# Patient Record
Sex: FEMALE | Race: BLACK OR AFRICAN AMERICAN | NOT HISPANIC OR LATINO | Employment: OTHER | URBAN - METROPOLITAN AREA
[De-identification: names, ages, dates, MRNs, and addresses within clinical notes are randomized per-mention and may not be internally consistent; named-entity substitution may affect disease eponyms.]

---

## 2023-08-30 LAB
ALBUMIN (G/DL) IN SER/PLAS: 4.3 G/DL (ref 3.4–5)
ANION GAP IN SER/PLAS: 13 MMOL/L (ref 10–20)
CALCIUM (MG/DL) IN SER/PLAS: 9.4 MG/DL (ref 8.6–10.3)
CARBAMAZEPINE (UG/ML) IN SER/PLAS: 9 UG/ML (ref 4–12)
CARBON DIOXIDE, TOTAL (MMOL/L) IN SER/PLAS: 27 MMOL/L (ref 21–32)
CHLORIDE (MMOL/L) IN SER/PLAS: 101 MMOL/L (ref 98–107)
CREATININE (MG/DL) IN SER/PLAS: 0.75 MG/DL (ref 0.5–1.05)
ERYTHROCYTE DISTRIBUTION WIDTH (RATIO) BY AUTOMATED COUNT: 12.2 % (ref 11.5–14.5)
ERYTHROCYTE MEAN CORPUSCULAR HEMOGLOBIN CONCENTRATION (G/DL) BY AUTOMATED: 31.2 G/DL (ref 32–36)
ERYTHROCYTE MEAN CORPUSCULAR VOLUME (FL) BY AUTOMATED COUNT: 91 FL (ref 80–100)
ERYTHROCYTES (10*6/UL) IN BLOOD BY AUTOMATED COUNT: 4.72 X10E12/L (ref 4–5.2)
GFR FEMALE: >90 ML/MIN/1.73M2
GLUCOSE (MG/DL) IN SER/PLAS: 88 MG/DL (ref 74–99)
HEMATOCRIT (%) IN BLOOD BY AUTOMATED COUNT: 42.9 % (ref 36–46)
HEMOGLOBIN (G/DL) IN BLOOD: 13.4 G/DL (ref 12–16)
KEPPRA: 29 UG/ML (ref 10–40)
LEUKOCYTES (10*3/UL) IN BLOOD BY AUTOMATED COUNT: 7.2 X10E9/L (ref 4.4–11.3)
NRBC (PER 100 WBCS) BY AUTOMATED COUNT: 0 /100 WBC (ref 0–0)
PHOSPHATE (MG/DL) IN SER/PLAS: 3.2 MG/DL (ref 2.5–4.9)
PLATELETS (10*3/UL) IN BLOOD AUTOMATED COUNT: 403 X10E9/L (ref 150–450)
POTASSIUM (MMOL/L) IN SER/PLAS: 4 MMOL/L (ref 3.5–5.3)
SODIUM (MMOL/L) IN SER/PLAS: 137 MMOL/L (ref 136–145)
UREA NITROGEN (MG/DL) IN SER/PLAS: 15 MG/DL (ref 6–23)

## 2023-09-01 LAB — CARBAMAZEPINE FREE (CC): 1.9 UG/ML (ref 0.8–2.4)

## 2023-10-04 DIAGNOSIS — F42.8 OBSESSIVE COMPULSIVE NEUROSIS: Primary | ICD-10-CM

## 2023-10-04 PROBLEM — F84.0 AUTISM SPECTRUM DISORDER (HHS-HCC): Status: ACTIVE | Noted: 2023-10-04

## 2023-10-04 PROBLEM — G40.909 EPILEPSY, UNSPECIFIED, NOT INTRACTABLE, WITHOUT STATUS EPILEPTICUS (MULTI): Status: ACTIVE | Noted: 2023-10-04

## 2023-10-04 PROBLEM — F79 INTELLECTUAL DISABILITY: Status: ACTIVE | Noted: 2023-10-04

## 2023-10-04 RX ORDER — RISPERIDONE 0.5 MG/1
TABLET ORAL
Qty: 60 TABLET | Refills: 3 | Status: SHIPPED | OUTPATIENT
Start: 2023-10-04 | End: 2024-01-03

## 2023-10-04 RX ORDER — RISPERIDONE 0.5 MG/1
TABLET ORAL
COMMUNITY
Start: 2020-10-12 | End: 2023-10-04 | Stop reason: SDUPTHER

## 2023-10-04 RX ORDER — SERTRALINE HYDROCHLORIDE 100 MG/1
1 TABLET, FILM COATED ORAL
COMMUNITY
Start: 2020-12-17 | End: 2023-10-04 | Stop reason: SDUPTHER

## 2023-10-04 RX ORDER — TRAZODONE HYDROCHLORIDE 100 MG/1
1 TABLET ORAL NIGHTLY
COMMUNITY
Start: 2022-08-18 | End: 2023-10-04 | Stop reason: SDUPTHER

## 2023-10-04 RX ORDER — TRAZODONE HYDROCHLORIDE 100 MG/1
100 TABLET ORAL NIGHTLY
Qty: 30 TABLET | Refills: 3 | Status: SHIPPED | OUTPATIENT
Start: 2023-10-04 | End: 2024-01-03

## 2023-10-04 RX ORDER — RISPERIDONE 0.25 MG/1
TABLET ORAL
Qty: 60 TABLET | Refills: 3 | Status: SHIPPED | OUTPATIENT
Start: 2023-10-04 | End: 2024-01-03

## 2023-10-04 RX ORDER — SERTRALINE HYDROCHLORIDE 50 MG/1
50 TABLET, FILM COATED ORAL DAILY
Qty: 30 TABLET | Refills: 3 | Status: SHIPPED | OUTPATIENT
Start: 2023-10-04 | End: 2024-01-03

## 2023-10-04 RX ORDER — CARBAMAZEPINE 100 MG/1
TABLET, CHEWABLE ORAL
COMMUNITY
Start: 2019-05-13 | End: 2023-10-11 | Stop reason: SDUPTHER

## 2023-10-04 RX ORDER — RISPERIDONE 0.25 MG/1
TABLET ORAL
COMMUNITY
Start: 2020-11-30 | End: 2023-10-04 | Stop reason: SDUPTHER

## 2023-10-04 RX ORDER — SERTRALINE HYDROCHLORIDE 50 MG/1
1 TABLET, FILM COATED ORAL
COMMUNITY
Start: 2021-02-11 | End: 2023-10-04 | Stop reason: SDUPTHER

## 2023-10-04 RX ORDER — POLYETHYLENE GLYCOL 3350 17 G/17G
1 POWDER, FOR SOLUTION ORAL DAILY
COMMUNITY
Start: 2023-04-29

## 2023-10-04 RX ORDER — LACTULOSE 10 G/15ML
SOLUTION ORAL; RECTAL 2 TIMES DAILY
COMMUNITY
Start: 2019-07-30 | End: 2024-04-22 | Stop reason: SDUPTHER

## 2023-10-04 RX ORDER — LEVETIRACETAM 100 MG/ML
SOLUTION ORAL 2 TIMES DAILY
COMMUNITY
Start: 2019-05-13

## 2023-10-04 RX ORDER — SERTRALINE HYDROCHLORIDE 100 MG/1
100 TABLET, FILM COATED ORAL DAILY
Qty: 30 TABLET | Refills: 3 | Status: SHIPPED | OUTPATIENT
Start: 2023-10-04 | End: 2024-01-03

## 2023-10-11 DIAGNOSIS — G40.409 OTHER GENERALIZED EPILEPSY, NOT INTRACTABLE, WITHOUT STATUS EPILEPTICUS (MULTI): ICD-10-CM

## 2023-10-11 RX ORDER — CARBAMAZEPINE 100 MG/1
500 TABLET, CHEWABLE ORAL 2 TIMES DAILY
Qty: 900 TABLET | Refills: 3 | Status: SHIPPED | OUTPATIENT
Start: 2023-10-11 | End: 2024-10-10

## 2023-11-22 ENCOUNTER — TELEMEDICINE (OUTPATIENT)
Dept: BEHAVIORAL HEALTH | Facility: CLINIC | Age: 38
End: 2023-11-22
Payer: MEDICAID

## 2023-11-22 DIAGNOSIS — F42.8 OBSESSIVE COMPULSIVE NEUROSIS: Primary | ICD-10-CM

## 2023-11-22 DIAGNOSIS — F84.0 AUTISM SPECTRUM DISORDER (HHS-HCC): ICD-10-CM

## 2023-11-22 DIAGNOSIS — F79 INTELLECTUAL DISABILITY: ICD-10-CM

## 2023-11-22 DIAGNOSIS — G40.409 OTHER GENERALIZED EPILEPSY, NOT INTRACTABLE, WITHOUT STATUS EPILEPTICUS (MULTI): ICD-10-CM

## 2023-11-22 DIAGNOSIS — Z79.899 HIGH RISK MEDICATION USE: ICD-10-CM

## 2023-11-22 PROBLEM — E66.3 OVERWEIGHT: Status: ACTIVE | Noted: 2023-11-22

## 2023-11-22 PROBLEM — R73.03 PREDIABETES: Status: ACTIVE | Noted: 2023-11-22

## 2023-11-22 PROBLEM — E78.5 HLD (HYPERLIPIDEMIA): Status: ACTIVE | Noted: 2023-11-22

## 2023-11-22 PROCEDURE — 99214 OFFICE O/P EST MOD 30 MIN: CPT | Performed by: PSYCHIATRY & NEUROLOGY

## 2023-11-22 RX ORDER — DOCUSATE SODIUM 100 MG/1
TABLET ORAL
COMMUNITY
Start: 2023-11-08

## 2023-11-22 RX ORDER — NICOTINE 11MG/24HR
PATCH, TRANSDERMAL 24 HOURS TRANSDERMAL
COMMUNITY
Start: 2023-11-08

## 2023-11-22 ASSESSMENT — ENCOUNTER SYMPTOMS
APPETITE CHANGE: 0
COUGH: 0
SEIZURES: 1
CHOKING: 0
CONSTIPATION: 0
TROUBLE SWALLOWING: 0
ACTIVITY CHANGE: 0
TREMORS: 0
SLEEP DISTURBANCE: 0
DIARRHEA: 0

## 2023-11-22 NOTE — PROGRESS NOTES
"Outpatient Psychiatry    A HIPAA-compliant interactive audio and video telecommunication system which permits real time communications between the patient (at the originating site) and provider (at the distant site) was utilized to provide this telehealth service.     The patient, family, caregivers and guardian (as appropriate) have provided consent on this date to conduct treatment via this telehealth service.  The patient's identity and physical location were verified at the time of this visit.      Present for appointment: Denise and Chato in [facility]    SUBJECTIVE    No major health problems for Denise since last visit.  Carbamazepine dose was increased by neurology a few months ago; she continues to have almost daily brief seizures lasting < 1 minute; she did have a longer GTC seizure at [facility] lasting about 3 minutes in the past few weeks.  Behaviorally, she is about the same as last visit.  She has her usual O/C behaviors but is mostly redirectable.  She can sometimes get \"pushy\" with staff or might put herself on the floor when upset.  She does not engage in any significant SIB.  Mood seems good, she seems happy and content.  Sleeping well at night; usually asleep by 20:00 then woken at midnight to toilet and back to sleep until about 05:30 when she has to get up for the day.  Moving bowels regularly (taking Miralax, lactulose and docusate).  No problems with taking medications.  No EPS or TD noted.    Review of Systems   Constitutional:  Negative for activity change and appetite change.   HENT:  Negative for drooling and trouble swallowing.    Respiratory:  Negative for cough and choking.    Gastrointestinal:  Negative for constipation and diarrhea.   Genitourinary:  Negative for enuresis.   Musculoskeletal:  Negative for gait problem.   Neurological:  Positive for seizures. Negative for tremors.   Psychiatric/Behavioral:  Positive for behavioral problems. Negative for self-injury and " sleep disturbance.      MEDICATION HISTORY  Depakote - elevated ammonia  Escitalopram - mentioned in records, unknown outcome  Ethosuximide - for seizures  Hydroxyzine  Lorazepam - disinhibition   Phenobarbital - for seizures per chart    CURRENT MEDICATIONS    Current Outpatient Medications:     carBAMazepine (TEGretol) 100 mg chewable tablet, Chew 5 tablets (500 mg) 2 times a day., Disp: 900 tablet, Rfl: 3    lactulose 10 gram/15 mL solution, Take by mouth twice a day., Disp: , Rfl:     levETIRAcetam 100 mg/mL solution, Take by mouth twice a day., Disp: , Rfl:     Miralax 17 gram/dose powder, Take 17 g by mouth once daily., Disp: , Rfl:     risperiDONE (RisperDAL) 0.25 mg tablet, 1 tablet twice daily, Disp: 60 tablet, Rfl: 3    risperiDONE (RisperDAL) 0.5 mg tablet, 1 tablet twice daily, Disp: 60 tablet, Rfl: 3    sertraline (Zoloft) 100 mg tablet, Take 1 tablet (100 mg) by mouth once daily., Disp: 30 tablet, Rfl: 3    sertraline (Zoloft) 50 mg tablet, Take 1 tablet (50 mg) by mouth once daily., Disp: 30 tablet, Rfl: 3    traZODone (Desyrel) 100 mg tablet, Take 1 tablet (100 mg) by mouth once daily at bedtime., Disp: 30 tablet, Rfl: 3    SOCIAL HISTORY  Living situation Waiver home with 2 female house-mates   Provider agency [facility] [place]   Work or day program [employer] 5 days/week   School N/A   Guardianship [employer] (Iglesia Gomez)   SSA ?   Bx Specialist ?   Nicotine None   Alcohol None   Other drugs None     OBJECTIVE    Lab Results   Component Value Date    HGB 13.4 08/30/2023     08/30/2023    NEUTROABS 7.01 05/29/2022    GLUCOSE 88 08/30/2023     08/30/2023    K 4.0 08/30/2023    CO2 27 08/30/2023    CALCIUM 9.4 08/30/2023    CREATININE 0.75 08/30/2023    AST 24 02/18/2020    ALT 18 02/18/2020    HGBA1C 6.1 (H) 09/15/2022    AMMONIA 89 (A) 07/20/2019     Therapeutic Drug Monitoring  08/30/2023: Carbamazepine level (total) = 9.0 [4-12] (900mg/day)  08/30/2023: Carbamazepine level (free) = 1.9  [0.8-2.4] (900mg/day)  08/30/2023: Keppra level = 29 [10-40] (4000mg/day)  09/26/2022: Keppra level = 30 [10-40] (3000mg/day)  05/29/2022: Keppra level = 22 [10-40] (2000mg/day)    Electrocardiograms  05/29/2022: Sinus tachycardia, , QTc 515    MENTAL STATUS EXAM  General/Appearance: Appropriate dress/hygiene/grooming.  Attitude/Behavior:  Not able to engage; lying down on couch.  Speech/Communication: Nonverbal.  Motor: No abnormal or involuntary movements observed.  Gait: Not assessed at this visit.  Mood: Neutral.  Affect: Neutral.  Thought processes: Unable to assess.  Thought content: Unable to assess.  Perception: Does not appear to be experiencing or responding to hallucinations.  Sensorium/Cognition: Drowsy.  Memory: Not directly assessed at this time.  Insight: Absent.  Judgment: Redirectable.    ASSESSMENT  Denise continues to do well.  She is still having some breakthrough seizures, but they are shorter in duration.  Behavioral issues are fairly well-controlled right now.  No indication to make treatment changes.  We will try to attempt a repeat EKG if possible for QTc monitoring.    PROBLEM LIST  Intellectual developmental disorder, severe  ASD  OCD  Seizure disorder    PLAN  -- Continue sertraline 150mg QAM for anxiety and OCD  -- Continue risperidone 0.75mg BID (7:00 and 17:00) for OCD and irritability/aggression  -- Continue trazodone 100mg at bedtime for sleep  -- Follow up 3 months    Juan Alvarado MD    Prep time on date of the patient encounter: 5 minutes   Time spent directly with patient/family/caregiver: 25 minutes   Additional time spent on patient care activities: 0 minutes   Documentation time: 5 minutes   Other time spent: 0 minutes   Total time on date of patient encounter: 35 minutes

## 2023-11-22 NOTE — PATIENT INSTRUCTIONS
Denise seems to be stable from a psychiatric/behavioral standpoint -- no treatment changes recommended today.    I have put in an order to attempt to have an updated EKG done (last done May 2022) if possible before our next visit.    Next appointment scheduled for Thursday 2/29/2024 at 3:15 PM virtual.

## 2023-12-21 ENCOUNTER — HOSPITAL ENCOUNTER (OUTPATIENT)
Dept: CARDIOLOGY | Facility: HOSPITAL | Age: 38
Discharge: HOME | End: 2023-12-21
Payer: MEDICAID

## 2023-12-21 DIAGNOSIS — Z79.899 HIGH RISK MEDICATION USE: ICD-10-CM

## 2023-12-21 PROCEDURE — 93005 ELECTROCARDIOGRAM TRACING: CPT

## 2023-12-21 PROCEDURE — 93010 ELECTROCARDIOGRAM REPORT: CPT | Performed by: STUDENT IN AN ORGANIZED HEALTH CARE EDUCATION/TRAINING PROGRAM

## 2023-12-29 LAB
ATRIAL RATE: 80 BPM
P AXIS: 46 DEGREES
P OFFSET: 206 MS
P ONSET: 162 MS
PR INTERVAL: 122 MS
Q ONSET: 223 MS
QRS COUNT: 13 BEATS
QRS DURATION: 68 MS
QT INTERVAL: 368 MS
QTC CALCULATION(BAZETT): 424 MS
QTC FREDERICIA: 405 MS
R AXIS: 21 DEGREES
T AXIS: 57 DEGREES
T OFFSET: 407 MS
VENTRICULAR RATE: 80 BPM

## 2024-01-03 DIAGNOSIS — F42.8 OBSESSIVE COMPULSIVE NEUROSIS: Primary | ICD-10-CM

## 2024-01-03 RX ORDER — TRAZODONE HYDROCHLORIDE 100 MG/1
100 TABLET ORAL NIGHTLY
Qty: 30 TABLET | Refills: 0 | Status: SHIPPED | OUTPATIENT
Start: 2024-01-03 | End: 2024-02-29 | Stop reason: SDUPTHER

## 2024-01-03 RX ORDER — RISPERIDONE 0.5 MG/1
TABLET ORAL
Qty: 60 TABLET | Refills: 0 | Status: SHIPPED | OUTPATIENT
Start: 2024-01-03 | End: 2024-02-29 | Stop reason: SDUPTHER

## 2024-01-03 RX ORDER — SERTRALINE HYDROCHLORIDE 50 MG/1
TABLET, FILM COATED ORAL
Qty: 30 TABLET | Refills: 0 | Status: SHIPPED | OUTPATIENT
Start: 2024-01-03 | End: 2024-02-29 | Stop reason: SDUPTHER

## 2024-01-03 RX ORDER — SERTRALINE HYDROCHLORIDE 100 MG/1
TABLET, FILM COATED ORAL
Qty: 30 TABLET | Refills: 0 | Status: SHIPPED | OUTPATIENT
Start: 2024-01-03 | End: 2024-02-29 | Stop reason: SDUPTHER

## 2024-01-03 RX ORDER — RISPERIDONE 0.25 MG/1
TABLET ORAL
Qty: 60 TABLET | Refills: 0 | Status: SHIPPED | OUTPATIENT
Start: 2024-01-03 | End: 2024-02-29 | Stop reason: SDUPTHER

## 2024-02-05 ENCOUNTER — TELEPHONE (OUTPATIENT)
Dept: NEUROSURGERY | Facility: CLINIC | Age: 39
End: 2024-02-05
Payer: MEDICAID

## 2024-02-07 DIAGNOSIS — G40.409 OTHER GENERALIZED EPILEPSY, NOT INTRACTABLE, WITHOUT STATUS EPILEPTICUS (MULTI): Primary | ICD-10-CM

## 2024-02-08 ENCOUNTER — LAB (OUTPATIENT)
Dept: LAB | Facility: LAB | Age: 39
End: 2024-02-08
Payer: MEDICAID

## 2024-02-08 DIAGNOSIS — G40.409 OTHER GENERALIZED EPILEPSY, NOT INTRACTABLE, WITHOUT STATUS EPILEPTICUS (MULTI): ICD-10-CM

## 2024-02-08 LAB
CARBAMAZEPINE SERPL-MCNC: 9.1 UG/ML (ref 4–12)
LEVETIRACETAM SERPL-MCNC: 30 UG/ML (ref 10–40)

## 2024-02-08 PROCEDURE — 80156 ASSAY CARBAMAZEPINE TOTAL: CPT

## 2024-02-08 PROCEDURE — 80177 DRUG SCRN QUAN LEVETIRACETAM: CPT

## 2024-02-08 PROCEDURE — 80157 ASSAY CARBAMAZEPINE FREE: CPT

## 2024-02-08 PROCEDURE — 36415 COLL VENOUS BLD VENIPUNCTURE: CPT

## 2024-02-09 ENCOUNTER — TELEPHONE (OUTPATIENT)
Dept: NEUROSURGERY | Facility: CLINIC | Age: 39
End: 2024-02-09
Payer: MEDICAID

## 2024-02-09 LAB
CARBAMAZEPINE FREE SERPL-MCNC: 1.8 UG/ML (ref 0.8–2.4)
SCAN RESULT: NORMAL

## 2024-02-09 NOTE — TELEPHONE ENCOUNTER
----- Message from Smitha Cortes DO sent at 2/9/2024  2:31 PM EST -----  Ok well that definitely could have triggered the seizure then.   ----- Message -----  From: Gilda Hall MA  Sent: 2/9/2024   1:30 PM EST  To: Smitha Cortes DO    Lina- pt's caregiver states she's doing much better. They saw pcp as well thinks she may have had a bug since she is doing fine now.  ----- Message -----  From: Smitha Cortes DO  Sent: 2/9/2024   1:01 PM EST  To: Gilda Hall MA    Please let the patient's caregivers know that her levels are normal.  They were low before because she missed the dose.  The hope would be that if she was on the right medications, missing one dose would not result in breakthrough seizure.  We could consider changing her carbamazepine over to a newer seizure medication to see if she gets better control, if they are agreeable.

## 2024-02-09 NOTE — TELEPHONE ENCOUNTER
----- Message from Smitha Cortes DO sent at 2/9/2024  1:01 PM EST -----  Please let the patient's caregivers know that her levels are normal.  They were low before because she missed the dose.  The hope would be that if she was on the right medications, missing one dose would not result in breakthrough seizure.  We could consider changing her carbamazepine over to a newer seizure medication to see if she gets better control, if they are agreeable.

## 2024-02-28 DIAGNOSIS — F42.8 OBSESSIVE COMPULSIVE NEUROSIS: ICD-10-CM

## 2024-02-29 ENCOUNTER — TELEMEDICINE (OUTPATIENT)
Dept: BEHAVIORAL HEALTH | Facility: CLINIC | Age: 39
End: 2024-02-29
Payer: MEDICAID

## 2024-02-29 DIAGNOSIS — F84.0 AUTISM SPECTRUM DISORDER (HHS-HCC): ICD-10-CM

## 2024-02-29 DIAGNOSIS — F79 INTELLECTUAL DISABILITY: ICD-10-CM

## 2024-02-29 DIAGNOSIS — F42.8 OBSESSIVE COMPULSIVE NEUROSIS: Primary | ICD-10-CM

## 2024-02-29 DIAGNOSIS — G40.409 OTHER GENERALIZED EPILEPSY, NOT INTRACTABLE, WITHOUT STATUS EPILEPTICUS (MULTI): ICD-10-CM

## 2024-02-29 PROCEDURE — 99214 OFFICE O/P EST MOD 30 MIN: CPT | Performed by: PSYCHIATRY & NEUROLOGY

## 2024-02-29 PROCEDURE — 1036F TOBACCO NON-USER: CPT | Performed by: PSYCHIATRY & NEUROLOGY

## 2024-02-29 RX ORDER — TRAZODONE HYDROCHLORIDE 100 MG/1
100 TABLET ORAL NIGHTLY
Qty: 30 TABLET | Refills: 3 | Status: SHIPPED | OUTPATIENT
Start: 2024-02-29 | End: 2024-06-06 | Stop reason: SDUPTHER

## 2024-02-29 RX ORDER — SERTRALINE HYDROCHLORIDE 100 MG/1
TABLET, FILM COATED ORAL
Qty: 30 TABLET | Refills: 10 | OUTPATIENT
Start: 2024-02-29

## 2024-02-29 RX ORDER — SERTRALINE HYDROCHLORIDE 100 MG/1
100 TABLET, FILM COATED ORAL DAILY
Qty: 30 TABLET | Refills: 3 | Status: SHIPPED | OUTPATIENT
Start: 2024-02-29 | End: 2024-06-06 | Stop reason: SDUPTHER

## 2024-02-29 RX ORDER — RISPERIDONE 0.5 MG/1
TABLET ORAL
Qty: 60 TABLET | Refills: 3 | Status: SHIPPED | OUTPATIENT
Start: 2024-02-29 | End: 2024-06-06 | Stop reason: SDUPTHER

## 2024-02-29 RX ORDER — RISPERIDONE 0.25 MG/1
TABLET ORAL
Qty: 60 TABLET | Refills: 3 | Status: SHIPPED | OUTPATIENT
Start: 2024-02-29 | End: 2024-06-06 | Stop reason: SDUPTHER

## 2024-02-29 RX ORDER — RISPERIDONE 0.5 MG/1
TABLET ORAL
Qty: 60 TABLET | Refills: 10 | OUTPATIENT
Start: 2024-02-29

## 2024-02-29 RX ORDER — SERTRALINE HYDROCHLORIDE 50 MG/1
TABLET, FILM COATED ORAL
Qty: 30 TABLET | Refills: 10 | OUTPATIENT
Start: 2024-02-29

## 2024-02-29 RX ORDER — TRAZODONE HYDROCHLORIDE 100 MG/1
100 TABLET ORAL NIGHTLY
Qty: 30 TABLET | Refills: 10 | OUTPATIENT
Start: 2024-02-29

## 2024-02-29 RX ORDER — RISPERIDONE 0.25 MG/1
TABLET ORAL
Qty: 60 TABLET | Refills: 10 | OUTPATIENT
Start: 2024-02-29

## 2024-02-29 RX ORDER — SERTRALINE HYDROCHLORIDE 50 MG/1
50 TABLET, FILM COATED ORAL DAILY
Qty: 30 TABLET | Refills: 3 | Status: SHIPPED | OUTPATIENT
Start: 2024-02-29 | End: 2024-06-06 | Stop reason: SDUPTHER

## 2024-02-29 ASSESSMENT — ENCOUNTER SYMPTOMS
TROUBLE SWALLOWING: 0
DIARRHEA: 0
CONSTIPATION: 0
TREMORS: 0
ACTIVITY CHANGE: 0
SLEEP DISTURBANCE: 0
SEIZURES: 1
COUGH: 0
APPETITE CHANGE: 0
CHOKING: 0

## 2024-02-29 NOTE — PROGRESS NOTES
"Outpatient Psychiatry    A HIPAA-compliant interactive audio and video telecommunication system which permits real time communications between the patient (at the originating site) and provider (at the distant site) was utilized to provide this telehealth service.     The patient, family, caregivers and guardian (as appropriate) have provided consent on this date to conduct treatment via this telehealth service.  The patient's identity and physical location were verified at the time of this visit.      Present for appointment: Denise and [facility] [place]  ([place]).    SUBJECTIVE    Denise has generally been doing well since we last met.    She was seen in the ED at [facility] in the early part of February for several breakthrough seizures.  Gilda thinks Denise may have had a viral illness at the time.  Her serum drug levels were very low and she had missed at least one dose of medications, possibly more.  No additional changes were made to her ASMs.  She has returned to her \"baseline\" in terms of seizure frequency/duration.    Behaviorally, she is about the same as her last few visits.  She has her usual O/C behaviors but is mostly redirectable.  She is not physically aggressive with staff or peers at home or day program.  She does not engage in any significant SIB.  Mood seems good, she seems happy and content.      She is generally sleeping well at night.  She often does not like getting up for day program and it will usually take about 1-2 hours in the morning for her to get up, get dressed, eat breakfast, and take medications.  She will often nap in the early part of the day on weekends.    Moving bowels regularly (taking Miralax, lactulose and docusate).      No problems with taking medications.  No EPS or TD noted.    Review of Systems   Constitutional:  Negative for activity change and appetite change.   HENT:  Negative for drooling and trouble swallowing.    Respiratory:  Negative for cough " and choking.    Gastrointestinal:  Negative for constipation and diarrhea.   Genitourinary:  Negative for enuresis.   Musculoskeletal:  Negative for gait problem.   Neurological:  Positive for seizures. Negative for tremors.   Psychiatric/Behavioral:  Positive for behavioral problems. Negative for self-injury and sleep disturbance.      MEDICATION HISTORY  Depakote - elevated ammonia  Escitalopram - mentioned in records, unknown outcome  Ethosuximide - for seizures  Hydroxyzine  Lorazepam - disinhibition   Phenobarbital - for seizures per chart    CURRENT MEDICATIONS    Current Outpatient Medications:     carBAMazepine (TEGretol) 100 mg chewable tablet, Chew 5 tablets (500 mg) 2 times a day., Disp: 900 tablet, Rfl: 3     mg tablet, , Disp: , Rfl:     lactulose 10 gram/15 mL solution, Take by mouth twice a day., Disp: , Rfl:     levETIRAcetam 100 mg/mL solution, Take by mouth twice a day., Disp: , Rfl:     Miralax 17 gram/dose powder, Take 17 g by mouth once daily., Disp: , Rfl:     risperiDONE (RisperDAL) 0.25 mg tablet, TAKE 1 TAB BY MOUTH TWICE DAILY, Disp: 60 tablet, Rfl: 0    risperiDONE (RisperDAL) 0.5 mg tablet, TAKE 1 TAB BY MOUTH TWICE DAILY, Disp: 60 tablet, Rfl: 0    sertraline (Zoloft) 100 mg tablet, TAKE 1 TAB BY MOUTH EVERY MORNING, Disp: 30 tablet, Rfl: 0    sertraline (Zoloft) 50 mg tablet, TAKE 1 TAB BY MOUTH EVERY MORNING, Disp: 30 tablet, Rfl: 0    traZODone (Desyrel) 100 mg tablet, TAKE 1 TAB BY MOUTH EVERY DAY AT BEDTIME, Disp: 30 tablet, Rfl: 0    Vitamin D3 50 mcg (2,000 unit) capsule, , Disp: , Rfl:     SOCIAL HISTORY  Living situation Waiver home with 2 female house-mates   Provider agency [facility] [place]   Work or day program [employer] 5 days/week   School N/A   Guardianship [employer] ([employer])   SSA ?   Bx Specialist ?   Nicotine None   Alcohol None   Other drugs None     OBJECTIVE    Lab Results   Component Value Date    HGB 13.4 08/30/2023     08/30/2023    NEUTROABS  7.01 05/29/2022    GLUCOSE 88 08/30/2023     08/30/2023    K 4.0 08/30/2023    CO2 27 08/30/2023    CALCIUM 9.4 08/30/2023    CREATININE 0.75 08/30/2023    AST 24 02/18/2020    ALT 18 02/18/2020    HGBA1C 6.0 (H) 11/24/2023    AMMONIA 89 (A) 07/20/2019     Therapeutic Drug Monitoring  08/30/2023: Carbamazepine level (total) = 9.0 [4-12] (900mg/day)  08/30/2023: Carbamazepine level (free) = 1.9 [0.8-2.4] (900mg/day)  08/30/2023: Keppra level = 29 [10-40] (4000mg/day)  09/26/2022: Keppra level = 30 [10-40] (3000mg/day)  05/29/2022: Keppra level = 22 [10-40] (2000mg/day)    Electrocardiograms  12/21/2023: NSR, non-sp TW abn, VR 80, QTc 424  05/29/2022: Sinus tachycardia, , QTc 515    MENTAL STATUS EXAM  General/Appearance: Appropriate dress/hygiene/grooming. Dysmorphic features.  Attitude/Behavior: Difficult to engage. Sits on couch with staff.  Speech/Communication: Nonverbal.  Motor: No abnormal or involuntary movements observed.  Gait: Ambulates w/o difficulty.  Mood: Neutral.  Affect:  Seems happy. Smiles.  Thought processes: Goal-directed.  Thought content: Unable to assess.  Perception: Does not appear to be experiencing or responding to hallucinations.  Sensorium/Cognition: Awake & alert. Oriented to self and surroundings. Intellectual impairment.  Memory: Not directly assessed at this time.  Insight: Absent.  Judgment: Redirectable.    ASSESSMENT  Denise continues to do well.  No significant behavioral issues are reported at this time.  No indication to make treatment changes at today's visit.    PROBLEM LIST  Intellectual developmental disorder, severe  ASD  OCD  Seizure disorder    PLAN  -- Continue sertraline 150mg QAM for anxiety and OCD  -- Continue risperidone 0.75mg BID (7:00 and 17:00) for OCD and irritability/aggression  -- Continue trazodone 100mg at bedtime for sleep  -- Follow up 3 months    Juan Alvarado MD    Prep time on date of the patient encounter: 5 minutes   Time spent  directly with patient/family/caregiver: 30 minutes   Additional time spent on patient care activities: 0 minutes   Documentation time: 5 minutes   Other time spent: 0 minutes   Total time on date of patient encounter: 40 minutes

## 2024-02-29 NOTE — PATIENT INSTRUCTIONS
Continue current medications for Denise.  Contact the office at [phone] to schedule a follow-up virtual appointment in 3 months (June 2024).

## 2024-04-22 DIAGNOSIS — E72.20 HYPERAMMONEMIA (MULTI): ICD-10-CM

## 2024-04-22 RX ORDER — LACTULOSE 10 G/15ML
10 SOLUTION ORAL; RECTAL 2 TIMES DAILY
Qty: 900 ML | Refills: 11 | Status: SHIPPED | OUTPATIENT
Start: 2024-04-22

## 2024-06-06 ENCOUNTER — OFFICE VISIT (OUTPATIENT)
Dept: BEHAVIORAL HEALTH | Facility: CLINIC | Age: 39
End: 2024-06-06
Payer: MEDICAID

## 2024-06-06 DIAGNOSIS — G40.409 OTHER GENERALIZED EPILEPSY, NOT INTRACTABLE, WITHOUT STATUS EPILEPTICUS (MULTI): ICD-10-CM

## 2024-06-06 DIAGNOSIS — F84.0 AUTISM SPECTRUM DISORDER (HHS-HCC): ICD-10-CM

## 2024-06-06 DIAGNOSIS — F42.8 OBSESSIVE COMPULSIVE NEUROSIS: Primary | ICD-10-CM

## 2024-06-06 DIAGNOSIS — F79 INTELLECTUAL DISABILITY: ICD-10-CM

## 2024-06-06 PROCEDURE — 99214 OFFICE O/P EST MOD 30 MIN: CPT | Performed by: PSYCHIATRY & NEUROLOGY

## 2024-06-06 PROCEDURE — 1036F TOBACCO NON-USER: CPT | Performed by: PSYCHIATRY & NEUROLOGY

## 2024-06-06 RX ORDER — SERTRALINE HYDROCHLORIDE 100 MG/1
100 TABLET, FILM COATED ORAL DAILY
Qty: 30 TABLET | Refills: 11 | Status: SHIPPED | OUTPATIENT
Start: 2024-06-06

## 2024-06-06 RX ORDER — SERTRALINE HYDROCHLORIDE 50 MG/1
50 TABLET, FILM COATED ORAL DAILY
Qty: 30 TABLET | Refills: 11 | Status: SHIPPED | OUTPATIENT
Start: 2024-06-06

## 2024-06-06 RX ORDER — RISPERIDONE 0.5 MG/1
0.5 TABLET ORAL 2 TIMES DAILY
Qty: 60 TABLET | Refills: 11 | Status: SHIPPED | OUTPATIENT
Start: 2024-06-06

## 2024-06-06 RX ORDER — RISPERIDONE 0.25 MG/1
0.25 TABLET ORAL 2 TIMES DAILY
Qty: 60 TABLET | Refills: 11 | Status: SHIPPED | OUTPATIENT
Start: 2024-06-06

## 2024-06-06 RX ORDER — TRAZODONE HYDROCHLORIDE 100 MG/1
100 TABLET ORAL NIGHTLY
Qty: 30 TABLET | Refills: 11 | Status: SHIPPED | OUTPATIENT
Start: 2024-06-06

## 2024-06-06 RX ORDER — RISPERIDONE 0.25 MG/1
1 TABLET ORAL 2 TIMES DAILY
Qty: 60 TABLET | Refills: 11 | Status: SHIPPED | OUTPATIENT
Start: 2024-06-06 | End: 2024-06-06 | Stop reason: SDUPTHER

## 2024-06-06 ASSESSMENT — ENCOUNTER SYMPTOMS
AGITATION: 0
CONSTIPATION: 0
ACTIVITY CHANGE: 0
SLEEP DISTURBANCE: 0
TROUBLE SWALLOWING: 0
CHOKING: 0
DIARRHEA: 0
APPETITE CHANGE: 0
SEIZURES: 1
COUGH: 0
TREMORS: 0

## 2024-06-06 ASSESSMENT — ABNORMAL INVOLUNTARY MOVEMENT SCALE (AIMS)
TONGUE: NONE, NORMAL
JAW: NONE, NORMAL
LOWER_BODY_EXTREMITIES: NONE, NORMAL
NECK_SHOULDER_HIPS: MINIMAL
UPPER_BODY_EXTREMITIES: MINIMAL
AIMS_SEVERITY: 1
AIMS_PATIENT_AWARENESS: NO AWARENESS
AIMS_PATIENT_INCAPACITATION: NONE, NORMAL
PATIENT_WEARS_DENTURES: NO
FACIAL_EXPRESSION_MUSCLES: NONE, NORMAL
LIPS_PARIETAL: NONE, NORMAL
CURRENT_PROBLEMS_TEETH_DENTURES: NO

## 2024-06-06 NOTE — PATIENT INSTRUCTIONS
Continue current medications for Denise.  Defer labs to PCP.  Call [phone] to set up next virtual appointment in 3-4 months (Sept/Oct 2024).

## 2024-06-06 NOTE — PROGRESS NOTES
Outpatient Psychiatry    A HIPAA-compliant interactive audio and video telecommunication system which permits real time communications between the patient (at the originating site) and provider (at the distant site) was utilized to provide this telehealth service.     The patient, family, caregivers and guardian (as appropriate) have provided consent on this date to conduct treatment via this telehealth service.  The patient's identity and physical location were verified at the time of this visit.      Present for appointment: Denise and Chato in [place] [facility] (Keyona Ness).    SUBJECTIVE    Denise has generally been doing well since we last met.  She has not seen her PCP (Dr. Marte at [facility]) since our last appointment.  Keyona reports no significant changes in her overall health or behaviors.    She has not had any trips to the ED for seizures, and overall frequency of seizures has been very minimal over the past few months.  No change to ASMs over the past three months.    Behaviorally, she is about the same as her last few visits.  She has her usual O/C behaviors, but is very redirectable.  She is not physically aggressive with staff or peers at home or day program.  She does not engage in any significant SIB.  Mood seems good, she seems happy and content.      She is generally sleeping well at night.  She still requires about 1-2 hours in the morning for her to get up, get dressed, eat breakfast, take medications, and get ready for day program.  She will often nap in the early part of the day on weekends.    There are no reports of problems with constipation.  She is still taking Miralax, lactulose, and docusate daily.      She wears Depends to help protect against bladder accidents at night, but is able to take herself to the toilet on her own during the day.    No problems with taking medications.  No EPS or TD noted.  She has not had any falls or unsteadiness when walking.    Review of  Systems   Constitutional:  Negative for activity change and appetite change.   HENT:  Negative for drooling and trouble swallowing.    Respiratory:  Negative for cough and choking.    Gastrointestinal:  Negative for constipation and diarrhea.   Genitourinary:  Negative for enuresis.   Musculoskeletal:  Negative for gait problem.   Neurological:  Positive for seizures. Negative for tremors.   Psychiatric/Behavioral:  Negative for agitation, behavioral problems, self-injury and sleep disturbance.      MEDICATION HISTORY  Depakote - elevated ammonia  Escitalopram - mentioned in records, unknown outcome  Ethosuximide - for seizures  Hydroxyzine  Lorazepam - disinhibition   Phenobarbital - for seizures per chart    CURRENT MEDICATIONS    Current Outpatient Medications:     carBAMazepine (TEGretol) 100 mg chewable tablet, Chew 5 tablets (500 mg) 2 times a day., Disp: 900 tablet, Rfl: 3     mg tablet, , Disp: , Rfl:     lactulose 20 gram/30 mL oral solution, Take 15 mL (10 g) by mouth 2 times a day., Disp: 900 mL, Rfl: 11    levETIRAcetam 100 mg/mL solution, Take by mouth twice a day., Disp: , Rfl:     Miralax 17 gram/dose powder, Take 17 g by mouth once daily., Disp: , Rfl:     risperiDONE (RisperDAL) 0.25 mg tablet, TAKE 1 TAB BY MOUTH TWICE DAILY, Disp: 60 tablet, Rfl: 3    risperiDONE (RisperDAL) 0.5 mg tablet, TAKE 1 TAB BY MOUTH TWICE DAILY, Disp: 60 tablet, Rfl: 3    sertraline (Zoloft) 100 mg tablet, Take 1 tablet (100 mg) by mouth once daily., Disp: 30 tablet, Rfl: 3    sertraline (Zoloft) 50 mg tablet, Take 1 tablet (50 mg) by mouth once daily., Disp: 30 tablet, Rfl: 3    traZODone (Desyrel) 100 mg tablet, Take 1 tablet (100 mg) by mouth once daily at bedtime., Disp: 30 tablet, Rfl: 3    Vitamin D3 50 mcg (2,000 unit) capsule, , Disp: , Rfl:     SOCIAL HISTORY  Living situation Waiver home with 2 female house-mates   Provider agency [facility] [place]   Work or day program [employer] 5 days/week   School  N/A   Guardianship [employer]   SSA ?   Bx Specialist ?   Nicotine None   Alcohol None   Other drugs None     OBJECTIVE    Lab Results   Component Value Date    HGB 13.4 08/30/2023     08/30/2023    NEUTROABS 7.01 05/29/2022    GLUCOSE 88 08/30/2023     08/30/2023    K 4.0 08/30/2023    CO2 27 08/30/2023    CALCIUM 9.4 08/30/2023    CREATININE 0.75 08/30/2023    AST 24 02/18/2020    ALT 18 02/18/2020    HGBA1C 6.0 (H) 11/24/2023    AMMONIA 89 (A) 07/20/2019     Therapeutic Drug Monitoring  02/08/2024: Carbamazepine level (total) = 9.1 [4-12] (1000mg/day)  02/08/2024: Carbamazepine level (free) = 1.8 [0.8-2.4] (1000mg/day)  02/08/2024: Keppra level = 30 [10-40] (4000mg/day)  08/30/2023: Carbamazepine level (total) = 9.0 [4-12] (900mg/day)  08/30/2023: Carbamazepine level (free) = 1.9 [0.8-2.4] (900mg/day)  08/30/2023: Keppra level = 29 [10-40] (4000mg/day)    Electrocardiograms  12/21/2023: NSR, non-sp TW abn, VR 80, QTc 424  05/29/2022: Sinus tachycardia, , QTc 515    MENTAL STATUS EXAM  General/Appearance: Appropriate dress/hygiene/grooming. Dysmorphic features.  Attitude/Behavior: Difficult to engage. Sits on couch with staff. Carries a stack of papers with her from the bedroom out to the living room.    Speech/Communication: Nonverbal.  Motor: No abnormal or involuntary movements observed. Stereotypic rocking.  Gait: Ambulates w/o difficulty.  Mood: Neutral.  Affect:  Seems happy. Smiles.  Thought processes: Goal-directed.  Thought content: Unable to assess.  Perception: Does not appear to be experiencing or responding to hallucinations.  Sensorium/Cognition: Awake & alert. Oriented to self and surroundings. Intellectual impairment.  Memory: Not directly assessed at this time.  Insight: Absent.  Judgment: Redirectable.    ASSESSMENT  Denise continues to do well.  No significant behavioral issues are reported at this time.  No indication to make treatment changes at today's visit.    PROBLEM  LIST  Intellectual developmental disorder, severe  ASD  OCD  Seizure disorder    PLAN  -- Continue sertraline 150mg QAM for anxiety and OCD  -- Continue risperidone 0.75mg BID (7:00 and 17:00) for OCD and irritability/aggression  -- Continue trazodone 100mg at bedtime for sleep  -- Follow up 3-4 months    Juan Alvarado MD    Prep time on date of the patient encounter: 5 minutes   Time spent directly with patient/family/caregiver: 25 minutes   Additional time spent on patient care activities: 0 minutes   Documentation time: 5 minutes   Other time spent: 0 minutes   Total time on date of patient encounter: 35 minutes

## 2024-08-15 DIAGNOSIS — G40.409 OTHER GENERALIZED EPILEPSY, NOT INTRACTABLE, WITHOUT STATUS EPILEPTICUS (MULTI): ICD-10-CM

## 2024-08-18 RX ORDER — CARBAMAZEPINE 100 MG/1
TABLET, CHEWABLE ORAL
Qty: 300 TABLET | Refills: 5 | Status: SHIPPED | OUTPATIENT
Start: 2024-08-18

## 2024-08-21 ENCOUNTER — OFFICE VISIT (OUTPATIENT)
Dept: NEUROLOGY | Facility: CLINIC | Age: 39
End: 2024-08-21
Payer: MEDICAID

## 2024-08-21 VITALS
SYSTOLIC BLOOD PRESSURE: 107 MMHG | BODY MASS INDEX: 28.02 KG/M2 | HEIGHT: 59 IN | RESPIRATION RATE: 94 BRPM | WEIGHT: 139 LBS | HEART RATE: 94 BPM | DIASTOLIC BLOOD PRESSURE: 78 MMHG

## 2024-08-21 DIAGNOSIS — G40.919 BREAKTHROUGH SEIZURE (MULTI): ICD-10-CM

## 2024-08-21 DIAGNOSIS — G40.409 OTHER GENERALIZED EPILEPSY, NOT INTRACTABLE, WITHOUT STATUS EPILEPTICUS (MULTI): Primary | ICD-10-CM

## 2024-08-21 PROCEDURE — 99214 OFFICE O/P EST MOD 30 MIN: CPT | Performed by: PSYCHIATRY & NEUROLOGY

## 2024-08-21 PROCEDURE — 3008F BODY MASS INDEX DOCD: CPT | Performed by: PSYCHIATRY & NEUROLOGY

## 2024-08-21 RX ORDER — LEVETIRACETAM 100 MG/ML
2000 SOLUTION ORAL 2 TIMES DAILY
Qty: 3600 ML | Refills: 3 | Status: SHIPPED | OUTPATIENT
Start: 2024-08-21 | End: 2025-08-21

## 2024-08-21 NOTE — PATIENT INSTRUCTIONS
Recommend to have her see a epilepsy specialist to try to get the rate of seizures back down to the 1-2 per month. She can either follow up long term with them or come back to me once seizures under better control.

## 2024-08-21 NOTE — PROGRESS NOTES
"Subjective   Denise Escamilla is a 39 y.o. female who comes in for follow up of seizures. Patient is accompanied by her care giver who provided the history.     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  She can go days without one.  She is calm all the time. There is no behavioral trigger.      She will just stare and not respond. It only last 30 seconds. She will blink with her eyes at times.      Review of Systems    Objective   /78   Pulse 94   Resp (!) 94   Ht 1.499 m (4' 11\")   Wt 63 kg (139 lb)   BMI 28.07 kg/m²   Neurological Exam  Physical Exam    Lab Results   Component Value Date     08/30/2023    K 4.0 08/30/2023     08/30/2023    CO2 27 08/30/2023    BUN 15 08/30/2023    CREATININE 0.75 08/30/2023    GLUCOSE 88 08/30/2023    CALCIUM 9.4 08/30/2023     Reviewed recent blood levels of Keppra and carbamazepine.     Assessment/Plan   Ms. 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 She was last seen in the neurology office by her prior neurologist in 2017 and at that time the caretakers were reporting twice monthly seizure frequency. The patient subsequently developed hyperammonemia which was thought to be secondary to Depakote. She was placed back on a low dose and had further elevation in her ammonia level so was completely stopped.      Caretakers are reporting continues to at least 4-5 seizures per month. Both Keppra and carbamazepine doses have been increased without improvement in seizure frequency.    Will refer to epilepsy specialist.      Smitha Cortes DO  "

## 2024-09-26 ENCOUNTER — APPOINTMENT (OUTPATIENT)
Dept: BEHAVIORAL HEALTH | Facility: CLINIC | Age: 39
End: 2024-09-26
Payer: MEDICAID

## 2024-09-26 DIAGNOSIS — F42.8 OBSESSIVE COMPULSIVE NEUROSIS: Primary | ICD-10-CM

## 2024-09-26 DIAGNOSIS — F84.0 AUTISM SPECTRUM DISORDER (HHS-HCC): ICD-10-CM

## 2024-09-26 DIAGNOSIS — F79 INTELLECTUAL DISABILITY: ICD-10-CM

## 2024-09-26 DIAGNOSIS — G40.909 NONINTRACTABLE EPILEPSY WITHOUT STATUS EPILEPTICUS, UNSPECIFIED EPILEPSY TYPE (MULTI): ICD-10-CM

## 2024-09-26 PROCEDURE — 1036F TOBACCO NON-USER: CPT | Performed by: PSYCHIATRY & NEUROLOGY

## 2024-09-26 PROCEDURE — 99214 OFFICE O/P EST MOD 30 MIN: CPT | Performed by: PSYCHIATRY & NEUROLOGY

## 2024-09-26 SDOH — ECONOMIC STABILITY: INCOME INSECURITY: IN THE LAST 12 MONTHS, WAS THERE A TIME WHEN YOU WERE NOT ABLE TO PAY THE MORTGAGE OR RENT ON TIME?: NO

## 2024-09-26 SDOH — HEALTH STABILITY: MENTAL HEALTH: HOW OFTEN DO YOU HAVE A DRINK CONTAINING ALCOHOL?: NEVER

## 2024-09-26 SDOH — ECONOMIC STABILITY: HOUSING INSECURITY: AT ANY TIME IN THE PAST 12 MONTHS, WERE YOU HOMELESS OR LIVING IN A SHELTER (INCLUDING NOW)?: NO

## 2024-09-26 SDOH — ECONOMIC STABILITY: FOOD INSECURITY: WITHIN THE PAST 12 MONTHS, THE FOOD YOU BOUGHT JUST DIDN'T LAST AND YOU DIDN'T HAVE MONEY TO GET MORE.: NEVER TRUE

## 2024-09-26 SDOH — SOCIAL STABILITY: SOCIAL INSECURITY
WITHIN THE LAST YEAR, HAVE TO BEEN RAPED OR FORCED TO HAVE ANY KIND OF SEXUAL ACTIVITY BY YOUR PARTNER OR EX-PARTNER?: NO

## 2024-09-26 SDOH — SOCIAL STABILITY: SOCIAL INSECURITY: WITHIN THE LAST YEAR, HAVE YOU BEEN AFRAID OF YOUR PARTNER OR EX-PARTNER?: NO

## 2024-09-26 SDOH — SOCIAL STABILITY: SOCIAL INSECURITY: WITHIN THE LAST YEAR, HAVE YOU BEEN HUMILIATED OR EMOTIONALLY ABUSED IN OTHER WAYS BY YOUR PARTNER OR EX-PARTNER?: NO

## 2024-09-26 SDOH — ECONOMIC STABILITY: INCOME INSECURITY: IN THE PAST 12 MONTHS, HAS THE ELECTRIC, GAS, OIL, OR WATER COMPANY THREATENED TO SHUT OFF SERVICE IN YOUR HOME?: NO

## 2024-09-26 SDOH — HEALTH STABILITY: MENTAL HEALTH: HOW OFTEN DO YOU HAVE 6 OR MORE DRINKS ON ONE OCCASION?: NEVER

## 2024-09-26 SDOH — ECONOMIC STABILITY: TRANSPORTATION INSECURITY
IN THE PAST 12 MONTHS, HAS THE LACK OF TRANSPORTATION KEPT YOU FROM MEDICAL APPOINTMENTS OR FROM GETTING MEDICATIONS?: NO

## 2024-09-26 SDOH — HEALTH STABILITY: MENTAL HEALTH: HOW MANY STANDARD DRINKS CONTAINING ALCOHOL DO YOU HAVE ON A TYPICAL DAY?: PATIENT DOES NOT DRINK

## 2024-09-26 SDOH — SOCIAL STABILITY: SOCIAL INSECURITY
WITHIN THE LAST YEAR, HAVE YOU BEEN KICKED, HIT, SLAPPED, OR OTHERWISE PHYSICALLY HURT BY YOUR PARTNER OR EX-PARTNER?: NO

## 2024-09-26 SDOH — ECONOMIC STABILITY: INCOME INSECURITY: HOW HARD IS IT FOR YOU TO PAY FOR THE VERY BASICS LIKE FOOD, HOUSING, MEDICAL CARE, AND HEATING?: NOT VERY HARD

## 2024-09-26 SDOH — HEALTH STABILITY: PHYSICAL HEALTH: ON AVERAGE, HOW MANY MINUTES DO YOU ENGAGE IN EXERCISE AT THIS LEVEL?: 0 MIN

## 2024-09-26 SDOH — ECONOMIC STABILITY: TRANSPORTATION INSECURITY
IN THE PAST 12 MONTHS, HAS LACK OF TRANSPORTATION KEPT YOU FROM MEETINGS, WORK, OR FROM GETTING THINGS NEEDED FOR DAILY LIVING?: NO

## 2024-09-26 SDOH — ECONOMIC STABILITY: FOOD INSECURITY: WITHIN THE PAST 12 MONTHS, YOU WORRIED THAT YOUR FOOD WOULD RUN OUT BEFORE YOU GOT MONEY TO BUY MORE.: NEVER TRUE

## 2024-09-26 SDOH — HEALTH STABILITY: PHYSICAL HEALTH: ON AVERAGE, HOW MANY DAYS PER WEEK DO YOU ENGAGE IN MODERATE TO STRENUOUS EXERCISE (LIKE A BRISK WALK)?: 0 DAYS

## 2024-09-26 ASSESSMENT — ENCOUNTER SYMPTOMS
TROUBLE SWALLOWING: 0
TREMORS: 0
AGITATION: 0
CONSTIPATION: 0
DIARRHEA: 0
APPETITE CHANGE: 0
COUGH: 0
ACTIVITY CHANGE: 0
SEIZURES: 1
SLEEP DISTURBANCE: 0
CHOKING: 0

## 2024-09-26 ASSESSMENT — LIFESTYLE VARIABLES
AUDIT-C TOTAL SCORE: 0
SKIP TO QUESTIONS 9-10: 1

## 2024-09-26 NOTE — PATIENT INSTRUCTIONS
Denise continues to do well.  No significant behavioral issues are reported at this time.  No indication to make treatment changes at today's visit.    Contact office at [phone] to schedule a virtual follow-up appointment in January 2025.

## 2024-09-26 NOTE — PROGRESS NOTES
Outpatient Adult IDD Psychiatry    A HIPAA-compliant interactive audio and video telecommunication system which permits real time communications between the patient (at the originating site) and provider (at the distant site) was utilized to provide this telehealth service.     The patient, family, caregivers and guardian (as appropriate) have provided consent on this date to conduct treatment via this telehealth service.  The patient's identity and physical location were verified at the time of this visit.      Present for appointment: Denise and Chato in [facility] ([place]).    SUBJECTIVE    Denise has generally been doing well since we last met.  She has not had any trips to the ED for seizures since we met in June.      She had follow-up with neurology in August and has been referred to an epilepsy specialist through  and will see them in January.  No changes to her ASMs were made at the August appointment.    She will see PCP for follow-up in the next 1-2 weeks.  She has not had any acute health concerns lately.    Behaviorally, she is about the same as her last few visits.  She has her usual O/C behaviors, but is very redirectable.  She is not physically aggressive with staff or peers at home or day program.  She does not engage in any significant SIB.  Mood seems good, she seems happy and content.      She is generally sleeping well at night and does not appear to be excessively tired during the daytime.    There are no reports of problems with constipation.  She is still taking Miralax, lactulose, and docusate daily.      No problems with taking medications.  No EPS or TD noted.  She has not had any falls or unsteadiness when walking.    Review of Systems   Constitutional:  Negative for activity change and appetite change.   HENT:  Negative for drooling and trouble swallowing.    Respiratory:  Negative for cough and choking.    Gastrointestinal:  Negative for constipation and diarrhea.    Genitourinary:  Negative for enuresis.   Musculoskeletal:  Negative for gait problem.   Neurological:  Positive for seizures. Negative for tremors.   Psychiatric/Behavioral:  Negative for agitation, behavioral problems, self-injury and sleep disturbance.      MEDICATION HISTORY  Depakote - elevated ammonia  Escitalopram - mentioned in records, unknown outcome  Ethosuximide - for seizures  Hydroxyzine  Lorazepam - disinhibition   Phenobarbital - for seizures per chart    CURRENT MEDICATIONS    Current Outpatient Medications:     carBAMazepine (TEGretol) 100 mg chewable tablet, TAKE 5 TABS (500MG) BY MOUTH TWICE DAILY, Disp: 300 tablet, Rfl: 5     mg tablet, , Disp: , Rfl:     lactulose 20 gram/30 mL oral solution, Take 15 mL (10 g) by mouth 2 times a day., Disp: 900 mL, Rfl: 11    levETIRAcetam 100 mg/mL solution, Take 20 mL (2,000 mg) by mouth 2 times a day., Disp: 3600 mL, Rfl: 3    Miralax 17 gram/dose powder, Take 17 g by mouth once daily., Disp: , Rfl:     risperiDONE (RisperDAL) 0.25 mg tablet, Take 1 tablet (0.25 mg) by mouth 2 times a day., Disp: 60 tablet, Rfl: 11    risperiDONE (RisperDAL) 0.5 mg tablet, Take 1 tablet (0.5 mg) by mouth 2 times a day., Disp: 60 tablet, Rfl: 11    sertraline (Zoloft) 100 mg tablet, Take 1 tablet (100 mg) by mouth once daily., Disp: 30 tablet, Rfl: 11    sertraline (Zoloft) 50 mg tablet, Take 1 tablet (50 mg) by mouth once daily., Disp: 30 tablet, Rfl: 11    traZODone (Desyrel) 100 mg tablet, Take 1 tablet (100 mg) by mouth once daily at bedtime., Disp: 30 tablet, Rfl: 11    Vitamin D3 50 mcg (2,000 unit) capsule, , Disp: , Rfl:     SOCIAL HISTORY  Living situation Waiver home with 2 female house-mates   Provider agency [facility] [place]   Work or day program [employer] 5 days/week   School N/A   Guardianship [employer]   SSA ?   Bx Specialist ?   Nicotine None   Alcohol None   Other drugs None     OBJECTIVE    Lab Results   Component Value Date    HGB 13.4 08/30/2023      08/30/2023    NEUTROABS 7.01 05/29/2022    GLUCOSE 88 08/30/2023     08/30/2023    K 4.0 08/30/2023    CO2 27 08/30/2023    CALCIUM 9.4 08/30/2023    CREATININE 0.75 08/30/2023    AST 24 02/18/2020    ALT 18 02/18/2020    HGBA1C 6.0 (H) 11/24/2023    AMMONIA 89 (A) 07/20/2019     Therapeutic Drug Monitoring  02/08/2024: Carbamazepine level (total) = 9.1 [4-12] (1000mg/day)  02/08/2024: Carbamazepine level (free) = 1.8 [0.8-2.4] (1000mg/day)  02/08/2024: Keppra level = 30 [10-40] (4000mg/day)  08/30/2023: Carbamazepine level (total) = 9.0 [4-12] (900mg/day)  08/30/2023: Carbamazepine level (free) = 1.9 [0.8-2.4] (900mg/day)  08/30/2023: Keppra level = 29 [10-40] (4000mg/day)    Electrocardiograms  12/21/2023: NSR, non-sp TW abn, VR 80, QTc 424  05/29/2022: Sinus tachycardia, , QTc 515    MENTAL STATUS EXAM  General/Appearance: Appropriate dress/hygiene/grooming. Dysmorphic features.  Attitude/Behavior: Difficult to engage. Walks from kitchen to dining room.  Speech/Communication: Nonverbal.  Motor: No abnormal or involuntary movements observed.   Gait: Ambulates w/o difficulty.  Mood: Neutral.  Affect:  Seems happy. Smiles.  Thought processes: Goal-directed.  Thought content: Unable to assess.  Perception: Does not appear to be experiencing or responding to hallucinations.  Sensorium/Cognition: Awake & alert. Oriented to self and surroundings. Intellectual impairment.  Memory: Not directly assessed at this time.  Insight: Absent.  Judgment: Redirectable.    ASSESSMENT  Denise continues to do well.  No significant behavioral issues are reported at this time.  No indication to make treatment changes at today's visit.    PROBLEM LIST  Intellectual developmental disorder, severe  ASD  OCD  Seizure disorder    PLAN  -- Continue sertraline 150mg QAM for anxiety and OCD  -- Continue risperidone 0.75mg BID (AM & 17:00) for OCD and irritability/aggression  -- Continue trazodone 100mg at bedtime for  sleep  -- Follow up 3 months    Juan Alvarado MD    Prep time on date of the patient encounter: 5 minutes   Time spent directly with patient/family/caregiver: 20 minutes   Additional time spent on patient care activities: 0 minutes   Documentation time: 5 minutes   Other time spent: 0 minutes   Total time on date of patient encounter: 30 minutes

## 2024-12-05 ENCOUNTER — APPOINTMENT (OUTPATIENT)
Dept: BEHAVIORAL HEALTH | Facility: CLINIC | Age: 39
End: 2024-12-05
Payer: MEDICAID

## 2025-01-01 ASSESSMENT — ENCOUNTER SYMPTOMS
SLEEP DISTURBANCE: 0
ACTIVITY CHANGE: 0
COUGH: 0
TREMORS: 0
TROUBLE SWALLOWING: 0
SEIZURES: 1
CONSTIPATION: 0
CHOKING: 0
DIARRHEA: 0
AGITATION: 0
APPETITE CHANGE: 0

## 2025-01-01 NOTE — PROGRESS NOTES
Outpatient Adult IDD Psychiatry    A HIPAA-compliant interactive audio and video telecommunication system which permits real time communications between the patient (at the originating site) and provider (at the distant site) was utilized to provide this telehealth service.     The patient, family, caregivers and guardian (as appropriate) have provided consent to conduct treatment via this telehealth service.      The patient's identity and physical location were verified at the time of this visit.     Present for appointment: Denise and Chato in [place] [facility] ([place]    Appointment location: Home.  [address] [place] [address] [place] [address]     SUBJECTIVE    Denise has generally been doing well since we last met.  She has not had any trips to the ED for seizures or other health concerns since we lat met in September.    She will see Neurology (Dr. Aileen Romero) through  next week on 1/07/25.  We do not have access to December's seizure tracking sheet.  She had documented seizures on both 1/01 and 1/02 so far this month.  There have been no changes to her ASMs.    Behaviorally, she is about the same as her last few visits.  She has her usual O/C behaviors (obsessive about collecting and shredding paper or cardboard), but is very redirectable.  She is not physically aggressive with staff or peers at home or day program.  She does not engage in any significant SIB.  Mood seems good, she seems happy and content.      She is generally sleeping well at night and does not appear to be excessively tired during the daytime.    There are no reports of problems with constipation.  She is still taking Miralax, lactulose, and docusate daily.  She has enuresis at night when sleeping just about every night.  She is continent of bowel and bladder during the daytime.    No problems with taking medications.  No EPS or TD noted.  She has not had any falls or unsteadiness when walking.    Review of Systems   Constitutional:   Negative for activity change and appetite change.   HENT:  Negative for drooling and trouble swallowing.    Respiratory:  Negative for cough and choking.    Gastrointestinal:  Negative for constipation and diarrhea.   Endocrine: Negative for polydipsia and polyuria.   Genitourinary:  Positive for enuresis (nocturnal).   Musculoskeletal:  Negative for gait problem.   Neurological:  Positive for seizures. Negative for tremors.   Psychiatric/Behavioral:  Negative for agitation, behavioral problems, self-injury and sleep disturbance.      MEDICATION HISTORY  Depakote - elevated ammonia  Escitalopram - mentioned in records, unknown outcome  Ethosuximide - for seizures  Hydroxyzine  Lorazepam - disinhibition   Phenobarbital - for seizures per chart    CURRENT MEDICATIONS    Current Outpatient Medications:     carBAMazepine (TEGretol) 100 mg chewable tablet, TAKE 5 TABS (500MG) BY MOUTH TWICE DAILY, Disp: 300 tablet, Rfl: 5     mg tablet, , Disp: , Rfl:     lactulose 20 gram/30 mL oral solution, Take 15 mL (10 g) by mouth 2 times a day., Disp: 900 mL, Rfl: 11    levETIRAcetam 100 mg/mL solution, Take 20 mL (2,000 mg) by mouth 2 times a day., Disp: 3600 mL, Rfl: 3    Miralax 17 gram/dose powder, Take 17 g by mouth once daily., Disp: , Rfl:     risperiDONE (RisperDAL) 0.25 mg tablet, Take 1 tablet (0.25 mg) by mouth 2 times a day., Disp: 60 tablet, Rfl: 11    risperiDONE (RisperDAL) 0.5 mg tablet, Take 1 tablet (0.5 mg) by mouth 2 times a day., Disp: 60 tablet, Rfl: 11    sertraline (Zoloft) 100 mg tablet, Take 1 tablet (100 mg) by mouth once daily., Disp: 30 tablet, Rfl: 11    sertraline (Zoloft) 50 mg tablet, Take 1 tablet (50 mg) by mouth once daily., Disp: 30 tablet, Rfl: 11    traZODone (Desyrel) 100 mg tablet, Take 1 tablet (100 mg) by mouth once daily at bedtime., Disp: 30 tablet, Rfl: 11    Vitamin D3 50 mcg (2,000 unit) capsule, , Disp: , Rfl:     SOCIAL HISTORY  Living situation Waiver home with 2 female  house-mates   Provider agency [facility] [place]   Work or day program [employer] 5 days/week   School N/A   Guardianship [employer]   SSA ?   Bx Specialist ?   Nicotine None   Alcohol None   Other drugs None     OBJECTIVE    Lab Results   Component Value Date    HGB 13.4 08/30/2023     08/30/2023    NEUTROABS 7.01 05/29/2022    GLUCOSE 88 08/30/2023     08/30/2023    K 4.0 08/30/2023    CO2 27 08/30/2023    CALCIUM 9.4 08/30/2023    CREATININE 0.75 08/30/2023    AST 24 02/18/2020    ALT 18 02/18/2020    HGBA1C 6.0 (H) 11/24/2023    AMMONIA 89 (A) 07/20/2019     Therapeutic Drug Monitoring  02/08/2024: Carbamazepine level (total) = 9.1 [4-12] (1000mg/day)  02/08/2024: Carbamazepine level (free) = 1.8 [0.8-2.4] (1000mg/day)  02/08/2024: Keppra level = 30 [10-40] (4000mg/day)  08/30/2023: Carbamazepine level (total) = 9.0 [4-12] (900mg/day)  08/30/2023: Carbamazepine level (free) = 1.9 [0.8-2.4] (900mg/day)  08/30/2023: Keppra level = 29 [10-40] (4000mg/day)    Electrocardiograms  12/21/2023: NSR, non-sp TW abn, VR 80, QTc 424  05/29/2022: Sinus tachycardia, , QTc 515    MENTAL STATUS EXAM  General/Appearance: Appropriate dress/hygiene/grooming. Sitting at table with staff. Dysmorphic features.  Attitude/Behavior: Difficult to engage. Poor/limited eye contact.  Speech/Communication: Nonverbal.  Motor: No abnormal or involuntary movements observed.   Gait: Not assessed at this visit.  Mood: Neutral.  Affect:  Seems happy. Smiles.  Thought processes: Goal-directed.  Thought content: Unable to assess.  Perception: Does not appear to be experiencing or responding to hallucinations.  Sensorium/Cognition: Awake & alert. Oriented to self and surroundings. Intellectual impairment.  Memory: Not directly assessed at this time.  Insight: Absent.  Judgment: Redirectable.    ASSESSMENT  Denise continues to do well.  No significant behavioral issues are reported at this time.  No indication to make treatment changes  at today's visit.    PROBLEM LIST  Intellectual developmental disorder, severe  ASD  OCD  Epilepsy    PLAN  -- Continue sertraline 150mg QAM for anxiety and OCD  -- Continue risperidone 0.75mg BID (AM & 17:00) for OCD and irritability/aggression  -- Continue trazodone 100mg at bedtime for sleep  -- Follow up 3 months    Juan Alvaraod MD    Prep time on date of the patient encounter: 0 minutes   Time spent directly with patient/family/caregiver: 20 minutes   Additional time spent on patient care activities: 0 minutes   Documentation time: 5 minutes   Other time spent: 0 minutes   Total time on date of patient encounter: 25 minutes

## 2025-01-02 ENCOUNTER — APPOINTMENT (OUTPATIENT)
Dept: BEHAVIORAL HEALTH | Facility: CLINIC | Age: 40
End: 2025-01-02
Payer: MEDICAID

## 2025-01-02 DIAGNOSIS — F42.8 OBSESSIVE COMPULSIVE NEUROSIS: Primary | ICD-10-CM

## 2025-01-02 DIAGNOSIS — F79 INTELLECTUAL DISABILITY: ICD-10-CM

## 2025-01-02 DIAGNOSIS — F84.0 AUTISM SPECTRUM DISORDER (HHS-HCC): ICD-10-CM

## 2025-01-02 PROCEDURE — 1036F TOBACCO NON-USER: CPT | Performed by: PSYCHIATRY & NEUROLOGY

## 2025-01-02 PROCEDURE — 99213 OFFICE O/P EST LOW 20 MIN: CPT | Performed by: PSYCHIATRY & NEUROLOGY

## 2025-01-02 ASSESSMENT — ENCOUNTER SYMPTOMS: POLYDIPSIA: 0

## 2025-01-02 NOTE — PATIENT INSTRUCTIONS
Denise continues to do well.    No significant behavioral issues are reported at this time.  No indication to make treatment changes at today's visit.  Please contact office to schedule virtual follow-up visit in 3 months (April 2025).

## 2025-01-07 ENCOUNTER — OFFICE VISIT (OUTPATIENT)
Dept: NEUROLOGY | Facility: CLINIC | Age: 40
End: 2025-01-07
Payer: MEDICAID

## 2025-01-07 VITALS — WEIGHT: 149 LBS | BODY MASS INDEX: 30.09 KG/M2

## 2025-01-07 DIAGNOSIS — G40.409 OTHER GENERALIZED EPILEPSY, NOT INTRACTABLE, WITHOUT STATUS EPILEPTICUS: Primary | ICD-10-CM

## 2025-01-07 PROCEDURE — 99215 OFFICE O/P EST HI 40 MIN: CPT | Performed by: PSYCHIATRY & NEUROLOGY

## 2025-01-07 PROCEDURE — 99205 OFFICE O/P NEW HI 60 MIN: CPT | Performed by: PSYCHIATRY & NEUROLOGY

## 2025-01-07 ASSESSMENT — PAIN SCALES - GENERAL: PAINLEVEL_OUTOF10: 0-NO PAIN

## 2025-01-07 NOTE — PATIENT INSTRUCTIONS
Denise Escamilla,    It was a pleasure seeing you in clinic today - you saw Dr. Perera and Dr. Bernardo for your epilepsy. Based on our discussion, we recommend continuing the Keppra 2000 mg (20 mL) twice a day and Carbamazepine 5 tabs (500 mg) twice a day.     We recommend getting a EEG (brain wave test) to better understand how controlled your epilepsy is. She will have stickers placed on her head for this. You will be called to schedule this at [facility] or [place]. We also recommend geotting blood work to make sure your seizure medications are in a good range.     Please follow up in clinic in 2 months with our nurse practitioner, Patricia Euceda after the above testing to see if medications need to be adjusted.      It was a pleasure seeing you in clinic,   Dr. Bernardo and Dr. Perera -  Epilepsy Care Team

## 2025-01-07 NOTE — PROGRESS NOTES
"EPILEPSY CLINIC NOTE    History of Present Illness: Denise Escamilla is a 39 y.o. 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 who presents to epilepsy clinic to establish care. She was previously following with Dr. Smitha Cortes. She presents to the visit with her group home caretaker.   '    Anamnesis per witness: Patient is non-verbal and not able to provide a history. History obtained from caretaker and review of prior records.     History from prior notes (from Dr. Lam and Dr. Cortes's notes): First documented seizure is at ~7.5 months of age in setting of fever to 104F. Second seizure at 9 months with focal right clonic jerking. She also had a prolonged status epilepticus episode in setting of fever at this time. Notes also documented Lennox-Gustaut at age of 18 months. It appears she was evaluated for neuro metabolic conditions with normal opthalmologic evaluation, normal bone marrow and lysosomal enzyme testing. 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 Notes document right hemibody clonic activity and generalized convulsions in addition to staring unresponsive spells. MRI brain at the time reportedly normal - no report on our end. She had several EEGs as a child - some records demonstrated bitemporal spikes and sharp waves and generalized/L slowing - see below. She was initially on phenobarbital before being transitioned to Keppra, Depakote and Carbamazepine. She was hospitalized in 2019 for hyperammonemia 2/2 Depakote - it was re-trialed at a lower dose but given persistent hyperammonemia tapered off and continued on Keppra and Carbamazepine.     Per her caretaker, Gilda, (who has been with her for 3 years), she has 5-10 seizures a month. Episodes described as \"staring into space with stiffening of upper extremities\"- brief lasting " "about 30-60 seconds. She is lethargic after. Caretaker provided seizure log with about 10 of these episodes in the past month. No clear triggers for these - can happen any time of the day. Also noted eyelid fluttering episodes that are brief.  No clear shaking observed. She can sometimes have these multiple times in a day. Per caretaker, baseline seizure frequency is about 5-10 episodes a month - stable in that regard. No recent injuries or falls from these episodes. Last GTC in notes documented in 5/2022 where she reportedly fell and they found her breathing sonorously - taken to ED at the time.     Seizure Risk Factors:  Birth: unknown    History of febrile seizures: yes   Development: abnormal   CNS infections: unknown  Head trauma: unknown  CNS surgery: unknown  Family hx of epilepsy: Unknown - family not involved in care     AEDs:  Home: LEV 2247-9459, -500   Previous: Phenobarbital, VPA (stopped d/t hyperammonemia)     Previous Admissions/EMU Stays:  None in our system    Prior Imaging   MRI: none in system (reportedly normal in the past)  [facility] 5/2022: normal    Prior EEGs:  None in our system.   Per prior notes:  \"per notes at time of complicated febrile seizure, EEG showed L slowing at that time  normal @ 12m.o. Abn at 18m.o., 1. bitemporal spikes & sharp waves 2. generalized slowing\"    No EEG results found for the past 12 months      ROS: All systems reviewed and were negative except as above    Home Medications:    Current Outpatient Medications   Medication Instructions    carBAMazepine (TEGretol) 100 mg chewable tablet TAKE 5 TABS (500MG) BY MOUTH TWICE DAILY     mg tablet     lactulose 10 g, oral, 2 times daily    levETIRAcetam (KEPPRA) 2,000 mg, oral, 2 times daily    Miralax 17 gram/dose powder 1 packet, oral, Daily    risperiDONE (RISPERDAL) 0.5 mg, oral, 2 times daily    risperiDONE (RISPERDAL) 0.25 mg, oral, 2 times daily    sertraline (ZOLOFT) 100 mg, oral, Daily    sertraline " (ZOLOFT) 50 mg, oral, Daily    traZODone (DESYREL) 100 mg, oral, Nightly    Vitamin D3 50 mcg (2,000 unit) capsule      Past Medical History:    has a past medical history of Disorder of urea cycle metabolism, unspecified (Multi) (09/11/2020).    Past Surgical History:    has no past surgical history on file.    Allergies:   No Known Allergies    Family History:   No family history on file.    Past Social History:   Social History     Tobacco Use    Smoking status: Never    Smokeless tobacco: Never   Substance Use Topics    Alcohol use: Never    Drug use: Never     Vitals:   There were no vitals filed for this visit.    Physical Exam:   General: Middle aged woman sitting up in bed in no distress. Intermittently attending to examiner - smiling and waving.   CN: Tracking across room appropriately - EOM grossly full. Face appears symmetric.   Motor: Moving all extremities spontaneously   Gait: Able to ambulate independently - wide based gait but no ataxia    Labs:   Carbamazepine 2/2024: 9.1 (total), 1.8 (free)   Keppra 2/2024: 30     Imaging:  No MRI head results found for the past 14 days  No CT head results found for the past 14 days    Assessment/Recommendations  Denise Escamilla is a 39 y.o. 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 who presents to epilepsy clinic to establish care. She was previously following with Dr. Smitha Cortes.   She has a baseline seizure frequency of 5-10 episodes of dialeptic events as noted below. No recent GTCs. No EEGs in the past 10 years - will attempt to obtain an extended EEG for better characterization of how active her epilepsy is and consider titrating AEDs accordingly.     4D Classification of the Paroxysmal Episodes:  Type: EPE     Episodes semiology: 1. Dialepsis -->BUE tonic --> 2--> GTC   Frequency: 1. 5-10 times a month 2. infrequent - last 2022   History of Status  Epilepticus: yes - childhood   Epileptogenic Zone: generalized   Etiology: known - LGS   Co-morbidities: ADHD, autism  Prior AEDs: PHB, VPA (hyperammoniemia)   Current AEDs: LEV 4858-0935, -500    Plan:   - Extended EEG to better characterize how active epilepsy is   - Obtain LEV and CBZ levels   - Continue home LEV 2165-8184 and -500   - Based on above EEG testing, can consider adding benzodiazepines such as clobazam 5 mg BID for better control   - Seizure precautions     Follow up in clinic with Patricia Euceda in 2 months after above testing.     Patient was seen, discussed and examined with attending, Dr. Perera who agrees with the assessment and plan.     Marlon Bernardo MD   Neurology, PGY2

## 2025-02-04 ENCOUNTER — HOSPITAL ENCOUNTER (OUTPATIENT)
Dept: NEUROLOGY | Facility: HOSPITAL | Age: 40
Discharge: HOME | End: 2025-02-04
Payer: MEDICAID

## 2025-02-04 DIAGNOSIS — G40.409 OTHER GENERALIZED EPILEPSY, NOT INTRACTABLE, WITHOUT STATUS EPILEPTICUS: ICD-10-CM

## 2025-02-04 PROCEDURE — 95816 EEG AWAKE AND DROWSY: CPT

## 2025-02-04 PROCEDURE — 95816 EEG AWAKE AND DROWSY: CPT | Performed by: PSYCHIATRY & NEUROLOGY

## 2025-02-27 DIAGNOSIS — G40.409 OTHER GENERALIZED EPILEPSY, NOT INTRACTABLE, WITHOUT STATUS EPILEPTICUS: ICD-10-CM

## 2025-02-27 RX ORDER — CARBAMAZEPINE 100 MG/1
TABLET, CHEWABLE ORAL
Qty: 300 TABLET | Refills: 10 | OUTPATIENT
Start: 2025-02-27

## 2025-03-05 DIAGNOSIS — G40.409 OTHER GENERALIZED EPILEPSY, NOT INTRACTABLE, WITHOUT STATUS EPILEPTICUS: ICD-10-CM

## 2025-03-05 RX ORDER — CARBAMAZEPINE 100 MG/1
500 TABLET, CHEWABLE ORAL 2 TIMES DAILY
Qty: 300 TABLET | Refills: 11 | Status: SHIPPED | OUTPATIENT
Start: 2025-03-05

## 2025-03-05 RX ORDER — LEVETIRACETAM 100 MG/ML
2000 SOLUTION ORAL 2 TIMES DAILY
Qty: 3600 ML | Refills: 3 | Status: SHIPPED | OUTPATIENT
Start: 2025-03-05 | End: 2026-03-05

## 2025-03-11 ENCOUNTER — OFFICE VISIT (OUTPATIENT)
Dept: NEUROLOGY | Facility: CLINIC | Age: 40
End: 2025-03-11
Payer: MEDICAID

## 2025-03-11 VITALS — BODY MASS INDEX: 29.29 KG/M2 | WEIGHT: 145 LBS

## 2025-03-11 DIAGNOSIS — G40.812 NONINTRACTABLE LENNOX-GASTAUT SYNDROME WITHOUT STATUS EPILEPTICUS (MULTI): Primary | ICD-10-CM

## 2025-03-11 PROCEDURE — 1036F TOBACCO NON-USER: CPT | Performed by: NURSE PRACTITIONER

## 2025-03-11 PROCEDURE — 99214 OFFICE O/P EST MOD 30 MIN: CPT | Performed by: NURSE PRACTITIONER

## 2025-03-11 RX ORDER — CLOBAZAM 10 MG/1
5 TABLET ORAL 2 TIMES DAILY
Qty: 30 TABLET | Refills: 5 | Status: SHIPPED | OUTPATIENT
Start: 2025-03-11 | End: 2025-09-07

## 2025-03-11 ASSESSMENT — PAIN SCALES - GENERAL: PAINLEVEL_OUTOF10: 0-NO PAIN

## 2025-03-11 NOTE — PROGRESS NOTES
"[facility]   Epilepsy    Patient ID: Denise Escamilla 40 y.o.female presenting in follow-up for previously diagnosed epilepsy  Patient of: Dr. 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     She presents to the visit with her group home caretaker.   '     4D Classification of the Paroxysmal Episodes:  Type: EPE     Episodes semiology: 1. Dialepsis -->BUE tonic --> 2--> GTC   Frequency: 1. 5-10 times a month 2. infrequent - last 2022   History of Status Epilepticus: yes - childhood   Epileptogenic Zone: generalized   Etiology: known - LGS   Co-morbidities: ADHD, autism  Prior AEDs: PHB, VPA (hyperammoniemia)   Current AEDs: LEV 0726-5059, -500    AEDs:  Home: LEV 9285-8765, -500   Previous: Phenobarbital, VPA (stopped d/t hyperammonemia)        RESULTS:  EEG    Result Date: 2/4/2025  IMPRESSION Impression This routine EEG is suggestive of a mild diffuse encephalopathy. No epileptiform discharges or lateralizing signs are seen. A full report will be scanned into the patient's chart at a later time. This report has been interpreted and electronically signed by                 Previous Admissions/EMU Stays:  None in our system     Prior Imaging   MRI: none in system (reportedly normal in the past)  [facility] 5/2022: normal     Prior EEGs:  Per prior notes:  \"per notes at time of complicated febrile seizure, EEG showed L slowing at that time  normal @ 12m.o. Abn at 18m.o., 1. bitemporal spikes & sharp waves 2. generalized slowing\"    PRESENT CONCERNS:  Has about 15 seizures per month per her caregiver that has been with her for 2 years       Review of Systems  All other systems reviewed and negative unless otherwise stated above    CONTROLLED SUBSTANCE  OARRS:  HARSHA Ledezma-GREG on 3/11/2025 11:13 AM  I have personally reviewed the OARRS report for Denise Escamilla. I have " considered the risks of abuse, dependence, addiction and diversion and I believe that it is clinically appropriate for Denise Escamilla to be prescribed this medication    Clinical rationale for not completing a Urine Drug Screen: Patient prescribed controlled substance for management of seizure, epilepsy, movement disorder      Vitals:  There were no vitals filed for this visit.    PHYSICAL EXAM:  Denise is non verbal   Follows commands   Able to feed herself   Can walk independently       ASSESSMENT & PLAN:   40 y.o. female presenting in follow-up for previously diagnosed epilepsy    Problem List Items Addressed This Visit       Epilepsy, unspecified, not intractable, without status epilepticus - Primary    Relevant Medications    cloBAZam (Onfi) 10 mg tablet     ~15 seizures per month per her caregiver   EEG had diffuse encephalopathy no epileptiform activity   Will trial Onfi to see if seizure reduction - watch for sleepiness/lethargy     Continue -500 and LEV 2g bid   Begin onfi 5mg bid  RTC 3 months   please follow seizure precautions which include no driving until 6 months seizure free and cleared by a provider  Call me with any seizures prior to your next appointment   Given my contact information/instructions for Tyrell

## 2025-03-25 DIAGNOSIS — G40.812 NONINTRACTABLE LENNOX-GASTAUT SYNDROME WITHOUT STATUS EPILEPTICUS (MULTI): ICD-10-CM

## 2025-03-25 RX ORDER — CLOBAZAM 10 MG/1
10 TABLET ORAL 2 TIMES DAILY
Qty: 60 TABLET | Refills: 5 | Status: SHIPPED | OUTPATIENT
Start: 2025-03-25 | End: 2025-09-21

## 2025-04-14 ENCOUNTER — APPOINTMENT (OUTPATIENT)
Dept: BEHAVIORAL HEALTH | Facility: CLINIC | Age: 40
End: 2025-04-14
Payer: MEDICAID

## 2025-04-24 DIAGNOSIS — F42.8 OBSESSIVE COMPULSIVE NEUROSIS: Primary | ICD-10-CM

## 2025-04-25 RX ORDER — SERTRALINE HYDROCHLORIDE 50 MG/1
50 TABLET, FILM COATED ORAL DAILY
Qty: 30 TABLET | Refills: 11 | Status: SHIPPED | OUTPATIENT
Start: 2025-04-25

## 2025-04-25 RX ORDER — TRAZODONE HYDROCHLORIDE 100 MG/1
100 TABLET ORAL NIGHTLY
Qty: 30 TABLET | Refills: 11 | Status: SHIPPED | OUTPATIENT
Start: 2025-04-25

## 2025-04-25 RX ORDER — RISPERIDONE 0.25 MG/1
0.25 TABLET ORAL 2 TIMES DAILY
Qty: 60 TABLET | Refills: 11 | Status: SHIPPED | OUTPATIENT
Start: 2025-04-25

## 2025-04-25 RX ORDER — SERTRALINE HYDROCHLORIDE 100 MG/1
100 TABLET, FILM COATED ORAL DAILY
Qty: 30 TABLET | Refills: 11 | Status: SHIPPED | OUTPATIENT
Start: 2025-04-25

## 2025-04-25 RX ORDER — RISPERIDONE 0.5 MG/1
0.5 TABLET ORAL 2 TIMES DAILY
Qty: 60 TABLET | Refills: 11 | Status: SHIPPED | OUTPATIENT
Start: 2025-04-25

## 2025-04-25 SDOH — HEALTH STABILITY: MENTAL HEALTH: HOW MANY DRINKS CONTAINING ALCOHOL DO YOU HAVE ON A TYPICAL DAY WHEN YOU ARE DRINKING?: PATIENT DOES NOT DRINK

## 2025-04-25 SDOH — SOCIAL STABILITY: SOCIAL INSECURITY: WITHIN THE LAST YEAR, HAVE YOU BEEN AFRAID OF YOUR PARTNER OR EX-PARTNER?: NO

## 2025-04-25 SDOH — HEALTH STABILITY: MENTAL HEALTH
DO YOU FEEL STRESS - TENSE, RESTLESS, NERVOUS, OR ANXIOUS, OR UNABLE TO SLEEP AT NIGHT BECAUSE YOUR MIND IS TROUBLED ALL THE TIME - THESE DAYS?: PATIENT UNABLE TO ANSWER

## 2025-04-25 SDOH — ECONOMIC STABILITY: HOUSING INSECURITY: AT ANY TIME IN THE PAST 12 MONTHS, WERE YOU HOMELESS OR LIVING IN A SHELTER (INCLUDING NOW)?: NO

## 2025-04-25 SDOH — SOCIAL STABILITY: SOCIAL INSECURITY: WITHIN THE LAST YEAR, HAVE YOU BEEN HUMILIATED OR EMOTIONALLY ABUSED IN OTHER WAYS BY YOUR PARTNER OR EX-PARTNER?: NO

## 2025-04-25 SDOH — SOCIAL STABILITY: SOCIAL NETWORK: HOW OFTEN DO YOU ATTEND MEETINGS OF THE CLUBS OR ORGANIZATIONS YOU BELONG TO?: PATIENT UNABLE TO ANSWER

## 2025-04-25 SDOH — SOCIAL STABILITY: SOCIAL INSECURITY: ARE YOU MARRIED, WIDOWED, DIVORCED, SEPARATED, NEVER MARRIED, OR LIVING WITH A PARTNER?: PATIENT UNABLE TO ANSWER

## 2025-04-25 SDOH — SOCIAL STABILITY: SOCIAL NETWORK: IN A TYPICAL WEEK, HOW MANY TIMES DO YOU TALK ON THE PHONE WITH FAMILY, FRIENDS, OR NEIGHBORS?: PATIENT UNABLE TO ANSWER

## 2025-04-25 SDOH — HEALTH STABILITY: MENTAL HEALTH: HOW OFTEN DO YOU HAVE A DRINK CONTAINING ALCOHOL?: NEVER

## 2025-04-25 SDOH — SOCIAL STABILITY: SOCIAL INSECURITY
WITHIN THE LAST YEAR, HAVE YOU BEEN RAPED OR FORCED TO HAVE ANY KIND OF SEXUAL ACTIVITY BY YOUR PARTNER OR EX-PARTNER?: NO

## 2025-04-25 SDOH — HEALTH STABILITY: PHYSICAL HEALTH
ON AVERAGE, HOW MANY DAYS PER WEEK DO YOU ENGAGE IN MODERATE TO STRENUOUS EXERCISE (LIKE A BRISK WALK)?: PATIENT UNABLE TO ANSWER

## 2025-04-25 SDOH — ECONOMIC STABILITY: FOOD INSECURITY
WITHIN THE PAST 12 MONTHS, THE FOOD YOU BOUGHT JUST DIDN'T LAST AND YOU DIDN'T HAVE MONEY TO GET MORE.: PATIENT UNABLE TO ANSWER

## 2025-04-25 SDOH — HEALTH STABILITY: PHYSICAL HEALTH
HOW OFTEN DO YOU NEED TO HAVE SOMEONE HELP YOU WHEN YOU READ INSTRUCTIONS, PAMPHLETS, OR OTHER WRITTEN MATERIAL FROM YOUR DOCTOR OR PHARMACY?: PATIENT UNABLE TO RESPOND

## 2025-04-25 SDOH — ECONOMIC STABILITY: TRANSPORTATION INSECURITY: IN THE PAST 12 MONTHS, HAS LACK OF TRANSPORTATION KEPT YOU FROM MEDICAL APPOINTMENTS OR FROM GETTING MEDICATIONS?: NO

## 2025-04-25 SDOH — ECONOMIC STABILITY: INCOME INSECURITY: IN THE PAST 12 MONTHS HAS THE ELECTRIC, GAS, OIL, OR WATER COMPANY THREATENED TO SHUT OFF SERVICES IN YOUR HOME?: NO

## 2025-04-25 SDOH — ECONOMIC STABILITY: FOOD INSECURITY
WITHIN THE PAST 12 MONTHS, YOU WORRIED THAT YOUR FOOD WOULD RUN OUT BEFORE YOU GOT THE MONEY TO BUY MORE.: PATIENT UNABLE TO ANSWER

## 2025-04-25 SDOH — HEALTH STABILITY: MENTAL HEALTH: HOW OFTEN DO YOU HAVE SIX OR MORE DRINKS ON ONE OCCASION?: NEVER

## 2025-04-25 SDOH — SOCIAL STABILITY: SOCIAL NETWORK: HOW OFTEN DO YOU GET TOGETHER WITH FRIENDS OR RELATIVES?: PATIENT UNABLE TO ANSWER

## 2025-04-25 SDOH — HEALTH STABILITY: PHYSICAL HEALTH: ON AVERAGE, HOW MANY MINUTES DO YOU ENGAGE IN EXERCISE AT THIS LEVEL?: PATIENT UNABLE TO ANSWER

## 2025-04-25 SDOH — ECONOMIC STABILITY: FOOD INSECURITY
HOW HARD IS IT FOR YOU TO PAY FOR THE VERY BASICS LIKE FOOD, HOUSING, MEDICAL CARE, AND HEATING?: PATIENT UNABLE TO ANSWER

## 2025-04-25 SDOH — ECONOMIC STABILITY: HOUSING INSECURITY
IN THE LAST 12 MONTHS, WAS THERE A TIME WHEN YOU WERE NOT ABLE TO PAY THE MORTGAGE OR RENT ON TIME?: PATIENT UNABLE TO ANSWER

## 2025-04-25 SDOH — SOCIAL STABILITY: SOCIAL NETWORK: HOW OFTEN DO YOU ATTEND CHURCH OR RELIGIOUS SERVICES?: PATIENT UNABLE TO ANSWER

## 2025-04-25 SDOH — SOCIAL STABILITY: SOCIAL NETWORK
DO YOU BELONG TO ANY CLUBS OR ORGANIZATIONS SUCH AS CHURCH GROUPS, UNIONS, FRATERNAL OR ATHLETIC GROUPS, OR SCHOOL GROUPS?: PATIENT UNABLE TO ANSWER

## 2025-04-25 ASSESSMENT — LIFESTYLE VARIABLES
AUDIT-C TOTAL SCORE: 0
SKIP TO QUESTIONS 9-10: 1

## 2025-04-25 ASSESSMENT — ACTIVITIES OF DAILY LIVING (ADL): LACK_OF_TRANSPORTATION: NO

## 2025-05-19 ENCOUNTER — APPOINTMENT (OUTPATIENT)
Dept: OBSTETRICS AND GYNECOLOGY | Facility: CLINIC | Age: 40
End: 2025-05-19
Payer: MEDICAID

## 2025-05-27 ENCOUNTER — APPOINTMENT (OUTPATIENT)
Dept: NEUROLOGY | Facility: CLINIC | Age: 40
End: 2025-05-27
Payer: MEDICAID

## 2025-05-30 ENCOUNTER — APPOINTMENT (OUTPATIENT)
Dept: OBSTETRICS AND GYNECOLOGY | Facility: CLINIC | Age: 40
End: 2025-05-30
Payer: MEDICAID

## 2025-06-03 ENCOUNTER — APPOINTMENT (OUTPATIENT)
Dept: NEUROLOGY | Facility: CLINIC | Age: 40
End: 2025-06-03
Payer: MEDICAID

## 2025-06-11 ENCOUNTER — APPOINTMENT (OUTPATIENT)
Dept: NEUROLOGY | Facility: CLINIC | Age: 40
End: 2025-06-11
Payer: MEDICAID

## 2025-06-13 ENCOUNTER — APPOINTMENT (OUTPATIENT)
Dept: BEHAVIORAL HEALTH | Facility: CLINIC | Age: 40
End: 2025-06-13
Payer: MEDICAID

## 2025-06-13 DIAGNOSIS — G40.409 OTHER GENERALIZED EPILEPSY, NOT INTRACTABLE, WITHOUT STATUS EPILEPTICUS: ICD-10-CM

## 2025-06-13 DIAGNOSIS — F79 INTELLECTUAL DISABILITY: ICD-10-CM

## 2025-06-13 DIAGNOSIS — F42.8 OBSESSIVE COMPULSIVE NEUROSIS: Primary | ICD-10-CM

## 2025-06-13 DIAGNOSIS — F84.0 AUTISM SPECTRUM DISORDER (HHS-HCC): ICD-10-CM

## 2025-06-13 PROCEDURE — 99214 OFFICE O/P EST MOD 30 MIN: CPT | Performed by: PSYCHIATRY & NEUROLOGY

## 2025-06-13 PROCEDURE — 1036F TOBACCO NON-USER: CPT | Performed by: PSYCHIATRY & NEUROLOGY

## 2025-06-13 SDOH — ECONOMIC STABILITY: HOUSING INSECURITY: IN THE LAST 12 MONTHS, WAS THERE A TIME WHEN YOU WERE NOT ABLE TO PAY THE MORTGAGE OR RENT ON TIME?: NO

## 2025-06-13 SDOH — ECONOMIC STABILITY: HOUSING INSECURITY: IN THE PAST 12 MONTHS, HOW MANY TIMES HAVE YOU MOVED WHERE YOU WERE LIVING?: 0

## 2025-06-13 SDOH — SOCIAL STABILITY: SOCIAL NETWORK: IN A TYPICAL WEEK, HOW MANY TIMES DO YOU TALK ON THE PHONE WITH FAMILY, FRIENDS, OR NEIGHBORS?: PATIENT UNABLE TO ANSWER

## 2025-06-13 SDOH — HEALTH STABILITY: MENTAL HEALTH: HOW OFTEN DO YOU HAVE SIX OR MORE DRINKS ON ONE OCCASION?: NEVER

## 2025-06-13 SDOH — HEALTH STABILITY: PHYSICAL HEALTH: ON AVERAGE, HOW MANY MINUTES DO YOU ENGAGE IN EXERCISE AT THIS LEVEL?: PATIENT UNABLE TO ANSWER

## 2025-06-13 SDOH — SOCIAL STABILITY: SOCIAL INSECURITY: WITHIN THE LAST YEAR, HAVE YOU BEEN HUMILIATED OR EMOTIONALLY ABUSED IN OTHER WAYS BY YOUR PARTNER OR EX-PARTNER?: NO

## 2025-06-13 SDOH — ECONOMIC STABILITY: INCOME INSECURITY: IN THE PAST 12 MONTHS HAS THE ELECTRIC, GAS, OIL, OR WATER COMPANY THREATENED TO SHUT OFF SERVICES IN YOUR HOME?: NO

## 2025-06-13 SDOH — HEALTH STABILITY: MENTAL HEALTH: HOW MANY DRINKS CONTAINING ALCOHOL DO YOU HAVE ON A TYPICAL DAY WHEN YOU ARE DRINKING?: PATIENT DOES NOT DRINK

## 2025-06-13 SDOH — SOCIAL STABILITY: SOCIAL NETWORK: HOW OFTEN DO YOU ATTEND MEETINGS OF THE CLUBS OR ORGANIZATIONS YOU BELONG TO?: PATIENT UNABLE TO ANSWER

## 2025-06-13 SDOH — ECONOMIC STABILITY: FOOD INSECURITY: WITHIN THE PAST 12 MONTHS, YOU WORRIED THAT YOUR FOOD WOULD RUN OUT BEFORE YOU GOT THE MONEY TO BUY MORE.: NEVER TRUE

## 2025-06-13 SDOH — SOCIAL STABILITY: SOCIAL INSECURITY: WITHIN THE LAST YEAR, HAVE YOU BEEN AFRAID OF YOUR PARTNER OR EX-PARTNER?: NO

## 2025-06-13 SDOH — ECONOMIC STABILITY: HOUSING INSECURITY: AT ANY TIME IN THE PAST 12 MONTHS, WERE YOU HOMELESS OR LIVING IN A SHELTER (INCLUDING NOW)?: NO

## 2025-06-13 SDOH — HEALTH STABILITY: MENTAL HEALTH: HOW OFTEN DO YOU HAVE A DRINK CONTAINING ALCOHOL?: NEVER

## 2025-06-13 SDOH — ECONOMIC STABILITY: TRANSPORTATION INSECURITY: IN THE PAST 12 MONTHS, HAS LACK OF TRANSPORTATION KEPT YOU FROM MEDICAL APPOINTMENTS OR FROM GETTING MEDICATIONS?: NO

## 2025-06-13 SDOH — ECONOMIC STABILITY: FOOD INSECURITY: WITHIN THE PAST 12 MONTHS, THE FOOD YOU BOUGHT JUST DIDN'T LAST AND YOU DIDN'T HAVE MONEY TO GET MORE.: NEVER TRUE

## 2025-06-13 SDOH — SOCIAL STABILITY: SOCIAL NETWORK: HOW OFTEN DO YOU GET TOGETHER WITH FRIENDS OR RELATIVES?: PATIENT UNABLE TO ANSWER

## 2025-06-13 SDOH — SOCIAL STABILITY: SOCIAL INSECURITY: ARE YOU MARRIED, WIDOWED, DIVORCED, SEPARATED, NEVER MARRIED, OR LIVING WITH A PARTNER?: PATIENT UNABLE TO ANSWER

## 2025-06-13 SDOH — SOCIAL STABILITY: SOCIAL NETWORK: HOW OFTEN DO YOU ATTEND CHURCH OR RELIGIOUS SERVICES?: PATIENT UNABLE TO ANSWER

## 2025-06-13 ASSESSMENT — ENCOUNTER SYMPTOMS
CONSTIPATION: 0
APPETITE CHANGE: 0
TREMORS: 0
COUGH: 0
ACTIVITY CHANGE: 0
SLEEP DISTURBANCE: 0
SEIZURES: 1
TROUBLE SWALLOWING: 0
AGITATION: 0
DIARRHEA: 0
POLYDIPSIA: 0
CHOKING: 0

## 2025-06-13 ASSESSMENT — LIFESTYLE VARIABLES
SKIP TO QUESTIONS 9-10: 1
AUDIT-C TOTAL SCORE: 0

## 2025-06-13 ASSESSMENT — ACTIVITIES OF DAILY LIVING (ADL): LACK_OF_TRANSPORTATION: NO

## 2025-06-13 NOTE — PROGRESS NOTES
Outpatient Adult IDD Psychiatry    A HIPAA-compliant interactive audio and video telecommunication system which permits real time communications between the patient (at the originating site) and provider (at the distant site) was utilized to provide this telehealth service.     The patient, family, caregivers and guardian (as appropriate) have provided consent to conduct treatment via this telehealth service.      The patient's identity and physical location were verified at the time of this visit.     Present for appointment: Denise and [facility] staff.    Appointment location: Home.  [place], [place]    SUBJECTIVE    Last visit with Denise was 1/02/25.    She saw her PCP for routine check-up and labs on 4/15/25: A1c = 6/2%, total cholesterol = 233, LDL = 140, TGS = 229.    She had dental work done at [facility] under anesthesia on 6/02/25: 3 cavities filled and 1 tooth extracted.    She was seen in the ED the next day (6/03/25) for a prolonged breakthrough seizure.    She is due to see Patricia Euceda in Neurology next week on 6/17/25.  She was started on clobazam in March.  Current ASMS: Keppra 4000mg/day, carbamazepine 1000mg/day, clobazam 20mg/day.    Behaviorally, she is about the same as her last few visits.  She has her usual O/C behaviors (obsessive about collecting and shredding paper or cardboard), but is very redirectable.  She is not physically aggressive with staff or peers at home or day program.  She does not engage in any significant SIB.  Mood seems good, she seems happy and content.      At times she has been a bit more frustrated with adjusting to some dietary changes implemented to address her elevated sugars and lipids.    She has been a bit more tired during the daytime since starting and increasing clobazam.    There are no reports of problems with constipation.  She is still taking Miralax, lactulose, and docusate daily.  She has enuresis at night when sleeping just about every night.   She is continent of bowel and bladder during the daytime.    No problems with taking medications.  No EPS or TD noted.  She has not had any falls or unsteadiness when walking.    Review of Systems   Constitutional:  Negative for activity change and appetite change.   HENT:  Negative for drooling and trouble swallowing.    Respiratory:  Negative for cough and choking.    Gastrointestinal:  Negative for constipation and diarrhea.   Endocrine: Negative for polydipsia and polyuria.   Genitourinary:  Positive for enuresis (nocturnal).   Musculoskeletal:  Negative for gait problem.   Neurological:  Positive for seizures. Negative for tremors.   Psychiatric/Behavioral:  Negative for agitation, behavioral problems, self-injury and sleep disturbance.      MEDICATION HISTORY  Depakote - elevated ammonia  Escitalopram - mentioned in records, unknown outcome  Ethosuximide - for seizures  Hydroxyzine  Lorazepam - disinhibition   Phenobarbital - for seizures per chart    CURRENT MEDICATIONS  Medications Prior to Visit[1]     SOCIAL HISTORY  Living situation Waiver home with 2 female house-mates   Provider agency [facility]   Work or day program [employer] 5 days/week   School N/A   Guardianship [employer]   SSA ?   Bx Specialist ?   Nicotine None   Alcohol None   Other drugs None     OBJECTIVE    Therapeutic Drug Monitoring  06/03/2025: Keppra level = < 2.0 [6-46] (4000mg/day)  06/03/2025: Carbamazepine level = 10.9 [4-12] (1000mg/day)  02/08/2024: Carbamazepine level (total) = 9.1 [4-12] (1000mg/day)  02/08/2024: Carbamazepine level (free) = 1.8 [0.8-2.4] (1000mg/day)  02/08/2024: Keppra level = 30 [10-40] (4000mg/day)  08/30/2023: Carbamazepine level (total) = 9.0 [4-12] (900mg/day)  08/30/2023: Carbamazepine level (free) = 1.9 [0.8-2.4] (900mg/day)  08/30/2023: Keppra level = 29 [10-40] (4000mg/day)    Electrocardiograms  06/03/2025: NSR, VR 88, QTc 486  06/03/2025: NSR, VR 96, QTc 468  12/21/2023: NSR, non-sp TW abn, VR 80,  QTc 424  05/29/2022: Sinus tachycardia, , QTc 515    MENTAL STATUS EXAM  General/Appearance: Appropriate dress/hygiene/grooming. Sitting in car passenger seat.  Attitude/Behavior: Difficult to engage. Poor/limited eye contact.  Speech/Communication: Nonverbal. Waves.  Motor: No abnormal or involuntary movements observed.   Gait: Not assessed at this visit.  Mood: Neutral.  Affect: Seems happy. Smiles.  Thought processes: Goal-directed.  Thought content: Unable to assess.  Perception: Does not appear to be experiencing or responding to hallucinations.  Sensorium/Cognition: Awake & alert. Oriented to self and surroundings. Intellectual impairment.  Memory: Not directly assessed at this time.  Insight: Absent.  Judgment: Redirectable.    ASSESSMENT  Denise continues to do well.  No significant behavioral issues are reported at this time.  No indication to make treatment changes at today's visit.    PROBLEM LIST  Intellectual developmental disorder, severe  ASD  OCD  Epilepsy    PLAN  -- Continue sertraline 150mg QAM for anxiety and OCD  -- Continue risperidone 0.75mg BID (AM & 17:00) for OCD and irritability/aggression  -- Continue trazodone 100mg at bedtime for sleep  -- Follow up 3 months    Juan Alvarado MD    Prep time on date of the patient encounter: 5 minutes   Time spent directly with patient/family/caregiver: 25 minutes   Additional time spent on patient care activities: 0 minutes   Documentation time: 5 minutes   Other time spent: 0 minutes   Total time on date of patient encounter: 35 minutes          [1]   Outpatient Medications Prior to Visit   Medication Sig Dispense Refill    carBAMazepine (TEGretol) 100 mg chewable tablet Chew 5 tablets (500 mg) 2 times a day. 300 tablet 11    cloBAZam (Onfi) 10 mg tablet Take 1 tablet (10 mg) by mouth 2 times a day. 60 tablet 5     mg tablet       lactulose 20 gram/30 mL oral solution Take 15 mL (10 g) by mouth 2 times a day. 900 mL 11     levETIRAcetam 100 mg/mL solution Take 20 mL (2,000 mg) by mouth 2 times a day. 3600 mL 3    Miralax 17 gram/dose powder Take 17 g by mouth once daily.      risperiDONE (RisperDAL) 0.25 mg tablet TAKE 1 TAB BY MOUTH TWICE DAILY 60 tablet 11    risperiDONE (RisperDAL) 0.5 mg tablet TAKE 1 TAB BY MOUTH TWICE DAILY 60 tablet 11    sertraline (Zoloft) 100 mg tablet TAKE 1 TAB BY MOUTH ONCE DAILY 30 tablet 11    sertraline (Zoloft) 50 mg tablet TAKE 1 TAB BY MOUTH ONCE DAILY 30 tablet 11    traZODone (Desyrel) 100 mg tablet TAKE 1 TAB BY MOUTH EVERY DAY AT BEDTIME 30 tablet 11    Vitamin D3 50 mcg (2,000 unit) capsule        No facility-administered medications prior to visit.

## 2025-06-13 NOTE — PATIENT INSTRUCTIONS
Denise continues to do well.  No significant behavioral issues are reported at this time.  No indication to make treatment changes at today's visit.    Contact office at [phone] to schedule next virtual appointment in 3 months (September 2025).

## 2025-06-17 ENCOUNTER — APPOINTMENT (OUTPATIENT)
Dept: NEUROLOGY | Facility: CLINIC | Age: 40
End: 2025-06-17
Payer: MEDICAID

## 2025-06-24 ENCOUNTER — APPOINTMENT (OUTPATIENT)
Dept: NEUROLOGY | Facility: CLINIC | Age: 40
End: 2025-06-24
Payer: MEDICAID

## 2025-06-24 VITALS
BODY MASS INDEX: 29.29 KG/M2 | WEIGHT: 145 LBS | RESPIRATION RATE: 18 BRPM | DIASTOLIC BLOOD PRESSURE: 73 MMHG | HEART RATE: 88 BPM | SYSTOLIC BLOOD PRESSURE: 105 MMHG

## 2025-06-24 DIAGNOSIS — G40.814 LENNOX-GASTAUT SYNDROME, INTRACTABLE, WITHOUT STATUS EPILEPTICUS (MULTI): Primary | ICD-10-CM

## 2025-06-24 PROCEDURE — 1036F TOBACCO NON-USER: CPT | Performed by: NURSE PRACTITIONER

## 2025-06-24 PROCEDURE — 99215 OFFICE O/P EST HI 40 MIN: CPT | Performed by: NURSE PRACTITIONER

## 2025-06-24 RX ORDER — CLOBAZAM 2.5 MG/ML
12.5 SUSPENSION ORAL 2 TIMES DAILY
Qty: 300 ML | Refills: 2 | Status: SHIPPED | OUTPATIENT
Start: 2025-06-24 | End: 2025-12-21

## 2025-06-24 ASSESSMENT — PAIN SCALES - GENERAL: PAINLEVEL_OUTOF10: 0-NO PAIN

## 2025-06-24 NOTE — PROGRESS NOTES
"[facility]   Epilepsy    Patient ID: Denise Escamilla 40 y.o.female presenting in follow-up for previously diagnosed epilepsy  Patient of: Dr. 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     She presents to the visit with her group home caretaker.   '     4D Classification of the Paroxysmal Episodes:  Type: EPE     Episodes semiology: 1. Dialepsis -->BUE tonic --> 2--> GTC   Frequency: 1. 5-10 times a month 2. infrequent - last 2022   History of Status Epilepticus: yes - childhood   Epileptogenic Zone: generalized   Etiology: known - LGS   Co-morbidities: ADHD, autism  Prior AEDs: PHB, VPA (hyperammoniemia)   Current AEDs: LEV 5952-0916, -500, CLB     AEDs:  Home: LEV 9939-9896, -500   Previous: Phenobarbital, VPA (stopped d/t hyperammonemia)        RESULTS:  EEG  Result Date: 2/4/2025  IMPRESSION Impression This routine EEG is suggestive of a mild diffuse encephalopathy. No epileptiform discharges or lateralizing signs are seen. A full report will be scanned into the patient's chart at a later time. This report has been interpreted and electronically signed by                  Previous Admissions/EMU Stays:  None in our system     Prior Imaging   MRI: none in system (reportedly normal in the past)  [facility] 5/2022: normal     Prior EEGs:  Per prior notes:  \"per notes at time of complicated febrile seizure, EEG showed L slowing at that time  normal @ 12m.o. Abn at 17m.o., 1. bitemporal spikes & sharp waves 2. generalized slowing\"    Has about 15 seizures per month per her caregiver that has been with her for 2 years       PRESENT CONCERNS:  She has been having more seizures at her day program. States she had a 10 minute seizure reported by her day program but they do not have description of  the seizure. Her seizures previously have been about less than minute. For this month she " has had ~6-7 seizures which is less as she has about 15 average but the seizures that do occur are longer.  Her caregiver reports that she has a hard time swallowing pills and doesn't always like to take her seizure medication. When labs were drawn in June during her dental procedure her LEV was <2      Review of Systems  All other systems reviewed and negative unless otherwise stated above    CONTROLLED SUBSTANCE  OARRS:  No data recorded  I have personally reviewed the OARRS report for Denise Escamilla. I have considered the risks of abuse, dependence, addiction and diversion and I believe that it is clinically appropriate for Denise Escamilal to be prescribed this medication    Clinical rationale for not completing a Urine Drug Screen: Patient prescribed controlled substance for management of seizure, epilepsy, movement disorder      Vitals:  There were no vitals filed for this visit.    PHYSICAL EXAM:  Denise is non verbal   Follows commands   Able to feed herself   Can walk independently       ASSESSMENT & PLAN:   40 y.o. female presenting in follow-up for previously diagnosed epilepsy    Problem List Items Addressed This Visit       Lennox-Gastaut syndrome, intractable, without status epilepticus (Multi) - Primary       ~6-7 seizures for June, this is decreased from 15  But dayprogram reports seizure are longer  -  EEG had diffuse encephalopathy no epileptiform activity       Continue -500 and LEV 2g bid   Increase 12.5mg CLB will switch to liquid as she has a hard time swallowing pills and will frequently hold pills in her mouth and not get full dose   RTC 3 months   please follow seizure precautions which include no driving until 6 months seizure free and cleared by a provider  Call me with any seizures prior to your next appointment   Given my contact information/instructions for [place]             Speaking Coherently

## 2025-06-24 NOTE — PATIENT INSTRUCTIONS
"Thank you for coming to the Epilepsy Clinic today.  -If you have any sudden new, concerning or worsening symptoms, call 911 and go to the Emergency Room. Otherwise, it was good seeing you today-    -Please follow seizure precautions:   Please do not drive, operate any heavy machinery, swim unsupervised, please shower without collection of water instead of bathe. Be cautious around hot, heavy, or sharp objects. Do not cook with an open flame and do not perform any activities at heights such as on a ladder. These precautions should stay in place until 6 months seizure free and cleared by a provider.    -HOW TO CONTACT DAYANA MARTINES EPILEPSY NURSE PRACTITIONER ([phone]).   Instructed to call in the event of seizure, medication refills, or any questions  *Please allow 24-48 hours for non-urgent responses*.  For emergency concerns, please dial 911 or present to the nearest emergency room.  For concerns after business hours (8am-4:30pm) or on weekends please call [phone]  To call and schedule a follow up appointment please call [phone]  -Paperwork may take up to 3 business days to complete-    Every attempt is made to run on time for your appointment, if you are 15 minutes or later for your appoinement you may be asked to reschedule    -Compliance education: It is important to continue to try and achieve seizure control because of the potential for injury and illness due to seizures. In a very small minority of patients with generalized tonic clonic seizures (\"grand mal\"), breathing or heart function can stop during a seizure and result in demise (sudden unexpected death in epilepsy or SUDEP). Bone Gap from seizures prevents this kind of outcome-     Increase clobazam to 12.5mg will switch to liquid which will be the dose of 5ml twice daily   "

## 2025-07-08 DIAGNOSIS — Z12.31 ENCOUNTER FOR SCREENING MAMMOGRAM FOR BREAST CANCER: ICD-10-CM

## 2025-07-24 DIAGNOSIS — G40.814 LENNOX-GASTAUT SYNDROME, INTRACTABLE, WITHOUT STATUS EPILEPTICUS (MULTI): Primary | ICD-10-CM

## 2025-07-25 RX ORDER — CLOBAZAM 10 MG/1
10 TABLET ORAL 2 TIMES DAILY
Qty: 60 TABLET | Refills: 5 | Status: SHIPPED | OUTPATIENT
Start: 2025-07-25 | End: 2026-01-21